# Patient Record
Sex: FEMALE | Race: WHITE | Employment: FULL TIME | URBAN - METROPOLITAN AREA
[De-identification: names, ages, dates, MRNs, and addresses within clinical notes are randomized per-mention and may not be internally consistent; named-entity substitution may affect disease eponyms.]

---

## 2017-01-14 ENCOUNTER — OFFICE VISIT (OUTPATIENT)
Dept: FAMILY MEDICINE CLINIC | Facility: CLINIC | Age: 37
End: 2017-01-14

## 2017-01-14 VITALS
OXYGEN SATURATION: 98 % | WEIGHT: 191 LBS | BODY MASS INDEX: 31.44 KG/M2 | HEIGHT: 65.5 IN | RESPIRATION RATE: 16 BRPM | TEMPERATURE: 98 F | HEART RATE: 112 BPM | DIASTOLIC BLOOD PRESSURE: 84 MMHG | SYSTOLIC BLOOD PRESSURE: 130 MMHG

## 2017-01-14 DIAGNOSIS — J45.901 ASTHMA WITH ACUTE EXACERBATION, UNSPECIFIED ASTHMA SEVERITY: ICD-10-CM

## 2017-01-14 DIAGNOSIS — J20.9 ACUTE BRONCHITIS, UNSPECIFIED ORGANISM: Primary | ICD-10-CM

## 2017-01-14 PROCEDURE — 99213 OFFICE O/P EST LOW 20 MIN: CPT | Performed by: NURSE PRACTITIONER

## 2017-01-14 RX ORDER — ALBUTEROL SULFATE 90 UG/1
2 AEROSOL, METERED RESPIRATORY (INHALATION) EVERY 4 HOURS PRN
Qty: 1 INHALER | Refills: 0 | Status: SHIPPED | OUTPATIENT
Start: 2017-01-14 | End: 2017-06-01

## 2017-01-14 RX ORDER — PREDNISONE 20 MG/1
TABLET ORAL
Qty: 10 TABLET | Refills: 0 | Status: SHIPPED | OUTPATIENT
Start: 2017-01-14 | End: 2017-02-02

## 2017-01-14 RX ORDER — AMOXICILLIN AND CLAVULANATE POTASSIUM 875; 125 MG/1; MG/1
1 TABLET, FILM COATED ORAL 2 TIMES DAILY
Qty: 20 TABLET | Refills: 0 | Status: SHIPPED | OUTPATIENT
Start: 2017-01-14 | End: 2017-01-24

## 2017-01-14 RX ORDER — BENZONATATE 200 MG/1
200 CAPSULE ORAL 3 TIMES DAILY PRN
Qty: 20 CAPSULE | Refills: 0 | Status: SHIPPED | OUTPATIENT
Start: 2017-01-14 | End: 2017-01-21

## 2017-01-14 NOTE — PATIENT INSTRUCTIONS
Discharge Instructions for Asthma  You have been diagnosed with asthma. With the help of your healthcare provider, you can keep your asthma under control and have less emergency department visits and hospitalizations.     Managing asthma  · Take your asth · Don’t allow anyone to smoke in your home, in your car, or around you. · Make sure you know what to do if exercise is a trigger for you. Many people use quick-relief inhalers before exercise or physical activity.   · Get a flu shot every year and get pneu If there is a lot of inflammation, air flow is restricted. The air passages may also go into spasm, especially if you have asthma. This causes wheezing and difficulty breathing even in people who do not have asthma. Bronchitis usually lasts 7 to 14 days. · If prescribed, finish all antibiotic medicine, even if you are feeling better after only a few days. Follow-up care  Follow up with your healthcare provider, or as advised. If you had an X-ray or ECG (electrocardiogram), a specialist will review it.  You

## 2017-01-14 NOTE — PROGRESS NOTES
CHIEF COMPLAINT:   Patient presents with:  Cough      HPI:   Pieter Juarez is a 44year old female who presents for cold symptoms for  2  weeks.    Pt was treated for sinus infection, right OM with amox, completed 10 day course 5 days ago, was approx 80% MI, CABG   • Diabetes Father    • cva[Other] [OTHER] Maternal Grandmother    • Cancer Paternal Grandfather 58     lung cancer        Smoking Status: Former Smoker                   Packs/Day: 0.50  Years: 13        Types: Cigarettes      Quit date: 11/01 Comfort care instructions as listed in Patient Instructions    Meds & Refills for this Visit:    Signed Prescriptions Disp Refills    Amoxicillin-Pot Clavulanate 875-125 MG Oral Tab 20 tablet 0      Sig: Take 1 tablet by mouth 2 (two) times daily.       pre · Use a vacuum with a double-layered bag or HEPA (high-efficiency particulate air) filter. · Pets with fur or feathers are triggers for some people.  If you must have pets, take these precautions:  · Keep pets out of your bedroom and off your bed. Keep the · If you monitor symptoms with a peak flow meter, readings less than 50% of your personal best   Date Last Reviewed: 8/18/2014  © 2842-8516 [facility] [address] [place] [address], [address] [place] [address]. All rights reserved.  This information is not i · You may use over-the-counter medicine to control fever or pain, unless another medicine was prescribed.  Note: If you have chronic liver or kidney disease or have ever had a stomach ulcer or gastrointestinal bleeding, talk with your healthcare provider be · Weakness, drowsiness, headache, facial pain, ear pain, or a stiff neck  Call 911, or get immediate medical care  Contact emergency services right away if any of these occur.   · Coughing up blood  · Worsening weakness, drowsiness, headache, or stiff neck

## 2017-02-02 ENCOUNTER — OFFICE VISIT (OUTPATIENT)
Dept: FAMILY MEDICINE CLINIC | Facility: CLINIC | Age: 37
End: 2017-02-02

## 2017-02-02 VITALS
SYSTOLIC BLOOD PRESSURE: 110 MMHG | DIASTOLIC BLOOD PRESSURE: 80 MMHG | OXYGEN SATURATION: 98 % | HEART RATE: 76 BPM | RESPIRATION RATE: 16 BRPM

## 2017-02-02 DIAGNOSIS — I10 ESSENTIAL HYPERTENSION: Primary | ICD-10-CM

## 2017-02-02 DIAGNOSIS — J45.30 MILD PERSISTENT ASTHMA WITHOUT COMPLICATION: ICD-10-CM

## 2017-02-02 PROCEDURE — 99214 OFFICE O/P EST MOD 30 MIN: CPT | Performed by: FAMILY MEDICINE

## 2017-02-02 NOTE — PROGRESS NOTES
Flavio Coleman is a 44year old female. Patient presents with: Follow - Up: blood pressure follow up      HPI:   Patient presents for recheck of her hypertension.  Pt has been taking medications as instructed, no medication side effects, home BP monitori puff into the lungs daily. Disp: 12 g Rfl: 1   Peak Flow Meter Does not apply Device When symptoms are well controlled, check peak flows at least once per week. 3 attempts and take the best number.  Disp: 1 Device Rfl: 0   Albuterol Sulfate HFA (PROAIR HFA improved  - cont lisinopril 10mg daily  - refill with 90 days when due next  Follow up in 4 mo    2.  Mild persistent asthma without complication  - peak flow 310   - will start ICS   - flovent 110mcg 1 puff daily  Patient instructed to make sure to rinse o

## 2017-02-03 ENCOUNTER — PATIENT MESSAGE (OUTPATIENT)
Dept: FAMILY MEDICINE CLINIC | Facility: CLINIC | Age: 37
End: 2017-02-03

## 2017-02-03 ENCOUNTER — TELEPHONE (OUTPATIENT)
Dept: FAMILY MEDICINE CLINIC | Facility: CLINIC | Age: 37
End: 2017-02-03

## 2017-02-03 NOTE — TELEPHONE ENCOUNTER
From: Pieter Juarez  To: Irish Parrish MD  Sent: 2/3/2017 6:57 AM CST  Subject: Prescription Question    Good morning:   I have an appointment yesterday evening and having a peak flow meter for my asthma was discussed.  A prescription for one was not sent

## 2017-02-03 NOTE — TELEPHONE ENCOUNTER
Please see above. Are there specific peak flow goals patient? Please advise. Thank you!     Triage, peak flow and flovent scripts re-sent to [facility].

## 2017-02-23 RX ORDER — LISINOPRIL 10 MG/1
TABLET ORAL
Qty: 30 TABLET | Refills: 2 | Status: SHIPPED | OUTPATIENT
Start: 2017-02-23 | End: 2017-06-01

## 2017-05-08 ENCOUNTER — OFFICE VISIT (OUTPATIENT)
Dept: FAMILY MEDICINE CLINIC | Facility: CLINIC | Age: 37
End: 2017-05-08

## 2017-05-08 VITALS
OXYGEN SATURATION: 97 % | DIASTOLIC BLOOD PRESSURE: 80 MMHG | WEIGHT: 203 LBS | TEMPERATURE: 98 F | BODY MASS INDEX: 33 KG/M2 | HEART RATE: 110 BPM | RESPIRATION RATE: 16 BRPM | SYSTOLIC BLOOD PRESSURE: 118 MMHG

## 2017-05-08 DIAGNOSIS — S81.801A WOUND OF RIGHT LEG, INITIAL ENCOUNTER: Primary | ICD-10-CM

## 2017-05-08 PROCEDURE — 99212 OFFICE O/P EST SF 10 MIN: CPT | Performed by: NURSE PRACTITIONER

## 2017-05-08 NOTE — PATIENT INSTRUCTIONS
Incision Care  Remember: Follow-up visits allow your doctor to make sure your incision is healing well. Be sure to keep your appointments.    Stitches (sutures), surgical staples, special strips of surgical tape called Steri-Strips, or surgical skin glue · Skin glue. Don’t put liquid, ointment, or cream on your wound while the glue is in place. Avoid activities that cause heavy sweating. Protect the wound from sunlight. Do not scratch, rub, or pick at the glue.  Do not put tape directly over the glue. The siobhan · More pain, redness, swelling, bleeding, or foul-smelling discharge around the incision area  · Fever of 100.4°F (38ºC) or higher or as directed by your healthcare provider  · Shaking chills  · Vomiting or nausea that doesn't go away  · Numbness, coldness

## 2017-05-08 NOTE — PROGRESS NOTES
CHIEF COMPLAINT:   Patient presents with:  Derm Problem: Right leg. HPI:   Scott Duarte is a 44year old female who presents for evaluation of a possible infection of a punch biopsy.  Pt states about 2 weeks ago pt had several punch/shaved biopsy 47     MI, CABG   • Diabetes Father    • cva[Other] [OTHER] Maternal Grandmother    • Cancer Paternal Grandfather 58     lung cancer        Smoking Status: Former Smoker                   Packs/Day: 0.50  Years: 13        Types: Cigarettes      Quit date: care discussed with patient.    Educated on proper wound care  Educated on following up with derm as derm instructed   Educated on s/s of worsening sx and when to seek higher level of care    Meds & Refills for this Visit:    No prescriptions requested or o

## 2017-06-01 ENCOUNTER — OFFICE VISIT (OUTPATIENT)
Dept: FAMILY MEDICINE CLINIC | Facility: CLINIC | Age: 37
End: 2017-06-01

## 2017-06-01 VITALS
RESPIRATION RATE: 16 BRPM | SYSTOLIC BLOOD PRESSURE: 110 MMHG | HEART RATE: 94 BPM | TEMPERATURE: 99 F | BODY MASS INDEX: 33 KG/M2 | WEIGHT: 203 LBS | DIASTOLIC BLOOD PRESSURE: 80 MMHG | OXYGEN SATURATION: 98 %

## 2017-06-01 DIAGNOSIS — J45.21 MILD INTERMITTENT ASTHMA WITH ACUTE EXACERBATION: ICD-10-CM

## 2017-06-01 DIAGNOSIS — E78.00 HYPERCHOLESTEROLEMIA: ICD-10-CM

## 2017-06-01 DIAGNOSIS — I10 ESSENTIAL HYPERTENSION: Primary | ICD-10-CM

## 2017-06-01 PROBLEM — J45.20 MILD INTERMITTENT ASTHMA WITHOUT COMPLICATION (HCC): Status: ACTIVE | Noted: 2017-06-01

## 2017-06-01 PROBLEM — J45.20 MILD INTERMITTENT ASTHMA WITHOUT COMPLICATION: Status: ACTIVE | Noted: 2017-06-01

## 2017-06-01 PROCEDURE — 99213 OFFICE O/P EST LOW 20 MIN: CPT | Performed by: FAMILY MEDICINE

## 2017-06-01 RX ORDER — ALBUTEROL SULFATE 90 UG/1
2 AEROSOL, METERED RESPIRATORY (INHALATION) EVERY 4 HOURS PRN
Qty: 1 INHALER | Refills: 3 | Status: SHIPPED | OUTPATIENT
Start: 2017-06-01 | End: 2018-09-03

## 2017-06-01 RX ORDER — LISINOPRIL 10 MG/1
10 TABLET ORAL
Qty: 90 TABLET | Refills: 1 | Status: SHIPPED | OUTPATIENT
Start: 2017-06-01 | End: 2017-11-15

## 2017-06-01 RX ORDER — PREDNISONE 20 MG/1
40 TABLET ORAL DAILY
Qty: 10 TABLET | Refills: 0 | Status: SHIPPED | OUTPATIENT
Start: 2017-06-01 | End: 2017-06-06

## 2017-06-01 NOTE — PROGRESS NOTES
Liv Ferrer is a 44year old female. Patient presents with: Follow - Up      HPI:     Here to follow up   1. HTN  Patient presents for recheck of her hypertension.    Pt has been taking medications as instructed, no medication side effects, home BP mo total) by mouth once daily. Disp: 90 tablet Rfl: 1   Ipratropium-Albuterol (COMBIVENT RESPIMAT)  MCG/ACT Inhalation Aero Soln Inhale 1 puff into the lungs every 6 (six) hours as needed (wheeizing and congestion).  Disp: 4 g Rfl: 2   predniSONE 20 MG O with acute exacerbation  Steroid burst for acute flare, combivent prn   Asthma action plan and asthma control test done.   Continue flovent 1 puff once daily   Reassess and see if she can wean off        Meds & Refills for this Visit:  Signed Prescriptions

## 2017-06-01 NOTE — PATIENT INSTRUCTIONS
· Continue to use Flovent once a day  · Use albuterol/ProAir as needed for activity and exercise. You can also use this before exercise. · Use Combivent as needed when you have wheezing plus congestion.   Do not use albuterol in addition to this - unless

## 2017-06-28 ENCOUNTER — OFFICE VISIT (OUTPATIENT)
Dept: FAMILY MEDICINE CLINIC | Facility: CLINIC | Age: 37
End: 2017-06-28

## 2017-06-28 VITALS
RESPIRATION RATE: 16 BRPM | WEIGHT: 207 LBS | BODY MASS INDEX: 34.49 KG/M2 | SYSTOLIC BLOOD PRESSURE: 122 MMHG | TEMPERATURE: 98 F | DIASTOLIC BLOOD PRESSURE: 80 MMHG | OXYGEN SATURATION: 98 % | HEIGHT: 65 IN | HEART RATE: 96 BPM

## 2017-06-28 DIAGNOSIS — H92.01 OTALGIA OF RIGHT EAR: Primary | ICD-10-CM

## 2017-06-28 PROCEDURE — 99213 OFFICE O/P EST LOW 20 MIN: CPT | Performed by: FAMILY MEDICINE

## 2017-06-29 NOTE — PROGRESS NOTES
Omar Neely is a 44year old female. Patient presents with:  Ear Pain: Right ear. HPI:   Omar Neely is a 44year old female who presents to clinic today with complaints of right ear pain. Has had for 2  days.  Pain is described as pain and fe Inhalation Aero Soln Inhale 1 puff into the lungs every 6 (six) hours as needed (wheeizing and congestion). Disp: 4 g Rfl: 2   Fluticasone Propionate HFA (FLOVENT HFA) 110 MCG/ACT Inhalation Aerosol Inhale 1 puff into the lungs daily.  Disp: 12 g Rfl: 1   l Patient/Caregiver Education: There are no barriers to learning. Medical education done. Outcome: Patient verbalizes understanding. Patient is notified to call with any questions, complications, allergies, or worsening or changing symptoms.   Patient is to

## 2017-07-28 RX ORDER — LISINOPRIL 10 MG/1
TABLET ORAL
Qty: 30 TABLET | Refills: 2 | Status: SHIPPED | OUTPATIENT
Start: 2017-07-28 | End: 2017-11-15

## 2017-08-26 ENCOUNTER — APPOINTMENT (OUTPATIENT)
Dept: LAB | Age: 37
End: 2017-08-26
Attending: FAMILY MEDICINE
Payer: COMMERCIAL

## 2017-08-26 DIAGNOSIS — N91.5 OLIGOMENORRHEA: ICD-10-CM

## 2017-08-26 LAB
ALBUMIN SERPL-MCNC: 3.9 G/DL (ref 3.5–4.8)
ALP LIVER SERPL-CCNC: 51 U/L (ref 37–98)
ALT SERPL-CCNC: 20 U/L (ref 14–54)
AST SERPL-CCNC: 12 U/L (ref 15–41)
BILIRUB SERPL-MCNC: 0.6 MG/DL (ref 0.1–2)
BUN BLD-MCNC: 7 MG/DL (ref 8–20)
CALCIUM BLD-MCNC: 9.2 MG/DL (ref 8.3–10.3)
CHLORIDE: 109 MMOL/L (ref 101–111)
CHOLEST SMN-MCNC: 196 MG/DL (ref ?–200)
CO2: 23 MMOL/L (ref 22–32)
CREAT BLD-MCNC: 0.77 MG/DL (ref 0.55–1.02)
GLUCOSE BLD-MCNC: 86 MG/DL (ref 70–99)
HDLC SERPL-MCNC: 51 MG/DL (ref 45–?)
HDLC SERPL: 3.84 {RATIO} (ref ?–4.44)
LDLC SERPL CALC-MCNC: 117 MG/DL (ref ?–130)
LDLC SERPL-MCNC: 28 MG/DL (ref 5–40)
M PROTEIN MFR SERPL ELPH: 7 G/DL (ref 6.1–8.3)
NONHDLC SERPL-MCNC: 145 MG/DL (ref ?–130)
POTASSIUM SERPL-SCNC: 3.9 MMOL/L (ref 3.6–5.1)
SODIUM SERPL-SCNC: 140 MMOL/L (ref 136–144)
TRIGLYCERIDES: 141 MG/DL (ref ?–150)

## 2017-11-15 ENCOUNTER — OFFICE VISIT (OUTPATIENT)
Dept: FAMILY MEDICINE CLINIC | Facility: CLINIC | Age: 37
End: 2017-11-15

## 2017-11-15 VITALS
SYSTOLIC BLOOD PRESSURE: 122 MMHG | RESPIRATION RATE: 16 BRPM | WEIGHT: 211 LBS | DIASTOLIC BLOOD PRESSURE: 76 MMHG | HEART RATE: 86 BPM | HEIGHT: 65 IN | TEMPERATURE: 98 F | BODY MASS INDEX: 35.16 KG/M2

## 2017-11-15 DIAGNOSIS — J45.30 MILD PERSISTENT ASTHMA WITHOUT COMPLICATION: ICD-10-CM

## 2017-11-15 DIAGNOSIS — I10 ESSENTIAL HYPERTENSION: Primary | ICD-10-CM

## 2017-11-15 PROCEDURE — 99214 OFFICE O/P EST MOD 30 MIN: CPT | Performed by: FAMILY MEDICINE

## 2017-11-15 RX ORDER — LISINOPRIL 10 MG/1
10 TABLET ORAL
Qty: 90 TABLET | Refills: 3 | Status: SHIPPED | OUTPATIENT
Start: 2017-11-15 | End: 2018-12-09

## 2017-11-15 NOTE — PROGRESS NOTES
Robin Perales is a 44year old female. Patient presents with:  Blood Pressure      HPI:     HYPERTENSION  Patient presents for recheck of her hypertension.  Pt has been taking medications as instructed, no medication side effects  No chest pain, SOB, hea Breath. Disp: 1 Inhaler Rfl: 3   Ipratropium-Albuterol (COMBIVENT RESPIMAT)  MCG/ACT Inhalation Aero Soln Inhale 1 puff into the lungs every 6 (six) hours as needed (wheeizing and congestion).  Disp: 4 g Rfl: 2   loratadine (CLARITIN) 10 MG Oral Tab T lisinopril 10 MG Oral Tab 90 tablet 3      Sig: Take 1 tablet (10 mg total) by mouth once daily. Mometasone Furo-Formoterol Fum (DULERA) 100-5 MCG/ACT Inhalation Aerosol 1 Inhaler 0      Sig: Inhale 1 puff into the lungs 2 (two) times daily.

## 2017-11-16 NOTE — PATIENT INSTRUCTIONS
Continue your lisinopril  Stop Flovent HFA (Fluticasone inhaler)  Start Dulera 1 puff twice a day  A prescription was sent to the pharmacy for a refill.  You may need the coupon card  Your peak flow should be around 430-440

## 2018-04-19 ENCOUNTER — OFFICE VISIT (OUTPATIENT)
Dept: FAMILY MEDICINE CLINIC | Facility: CLINIC | Age: 38
End: 2018-04-19

## 2018-04-19 VITALS
SYSTOLIC BLOOD PRESSURE: 118 MMHG | TEMPERATURE: 98 F | BODY MASS INDEX: 33.99 KG/M2 | RESPIRATION RATE: 16 BRPM | HEIGHT: 65 IN | DIASTOLIC BLOOD PRESSURE: 78 MMHG | HEART RATE: 90 BPM | OXYGEN SATURATION: 98 % | WEIGHT: 204 LBS

## 2018-04-19 DIAGNOSIS — J06.9 VIRAL URI: ICD-10-CM

## 2018-04-19 DIAGNOSIS — J45.901 EXACERBATION OF ASTHMA, UNSPECIFIED ASTHMA SEVERITY, UNSPECIFIED WHETHER PERSISTENT: Primary | ICD-10-CM

## 2018-04-19 PROCEDURE — 99213 OFFICE O/P EST LOW 20 MIN: CPT | Performed by: NURSE PRACTITIONER

## 2018-04-19 PROCEDURE — 88175 CYTOPATH C/V AUTO FLUID REDO: CPT | Performed by: OBSTETRICS & GYNECOLOGY

## 2018-04-19 PROCEDURE — 87624 HPV HI-RISK TYP POOLED RSLT: CPT | Performed by: OBSTETRICS & GYNECOLOGY

## 2018-04-19 RX ORDER — PREDNISONE 20 MG/1
20 TABLET ORAL 2 TIMES DAILY
Qty: 10 TABLET | Refills: 0 | Status: SHIPPED | OUTPATIENT
Start: 2018-04-19 | End: 2018-04-24

## 2018-04-19 NOTE — PROGRESS NOTES
CHIEF COMPLAINT:   Patient presents with:  Nasal Congestion: awoke this morning with congestion, not taking asthma med, feels wheezy today      HPI:   Theresa Turner is a 43year old female who presents for cold symptoms since this morning.  Reports sinus lung cancer      Smoking status: Former Smoker                                                              Packs/day: 0.50      Years: 13.00        Types: Cigarettes     Quit date: 11/1/2010  Smokeless tobacco: Never Used                      Alcohol us Exacerbation of asthma: Meds as below. Comfort care instructions as listed in Patient Instructions. Advised pt to take her inhalers as prescribed. Viral URI: Comfort care per pt instructions. Advised mucinex or flonase otc.      Meds & Refills for this · If you do vacuum and dust yourself, wear a dust mask. You can buy them from the hardware store. · Use a vacuum with a double-layered bag or HEPA (high-efficiency particulate air) filter. Pets with fur or feathers are triggers for some people.  If you mu · Shortness of breath that is not relieved by your quick-relief medicine  · Trouble walking or talking because of shortness of breath  · Blue lips or fingernails  · If you monitor symptoms with a peak flow meter, readings less than 50% of your personal bes · Your appetite may be poor, so a light diet is fine. Avoid dehydration by drinking 6 to 8 glasses of fluids per day (water, soft drinks, juices, tea, or soup). Extra fluids will help loosen secretions in the nose and lungs.   · Over-the-counter cold medici

## 2018-04-19 NOTE — PATIENT INSTRUCTIONS
Discharge Instructions for Asthma  You have been diagnosed with an asthma attack.  With the help of your healthcare provider, you can keep your asthma under control and have less emergency department visits and stays in the hospital.    Managing asthma  · · Don't use carpets or cloth-covered furniture in your home. If this is not possible, keep pets out of rooms with these items. · Have someone bathe your pets every week. And brush them often. If you smoke, do your best to quit.   · Enroll in a stop-smokin © 7848-2820 [employer] [address] [address]. [address] [facility], [facility] [place]. All rights reserved. This information is not intended as a substitute for professional medical care. Always follow your healthcare professional's instructions.         Viral U · Over-the-counter cold medicines will not shorten the length of time you’re sick, but they may be helpful for the following symptoms: cough, sore throat, and nasal and sinus congestion.  (Note: Do not use decongestants if you have high blood pressure.)  Fo

## 2018-09-03 ENCOUNTER — OFFICE VISIT (OUTPATIENT)
Dept: FAMILY MEDICINE CLINIC | Facility: CLINIC | Age: 38
End: 2018-09-03
Payer: COMMERCIAL

## 2018-09-03 VITALS
DIASTOLIC BLOOD PRESSURE: 78 MMHG | BODY MASS INDEX: 36 KG/M2 | HEART RATE: 89 BPM | TEMPERATURE: 98 F | SYSTOLIC BLOOD PRESSURE: 128 MMHG | OXYGEN SATURATION: 98 % | WEIGHT: 213.63 LBS | RESPIRATION RATE: 18 BRPM

## 2018-09-03 DIAGNOSIS — J06.9 VIRAL URI WITH COUGH: Primary | ICD-10-CM

## 2018-09-03 DIAGNOSIS — J45.30 MILD PERSISTENT ASTHMA WITHOUT COMPLICATION: ICD-10-CM

## 2018-09-03 PROCEDURE — 99213 OFFICE O/P EST LOW 20 MIN: CPT | Performed by: NURSE PRACTITIONER

## 2018-09-03 RX ORDER — PREDNISONE 20 MG/1
40 TABLET ORAL DAILY
Qty: 10 TABLET | Refills: 0 | Status: SHIPPED | OUTPATIENT
Start: 2018-09-03 | End: 2018-09-08

## 2018-09-03 RX ORDER — ALBUTEROL SULFATE 90 UG/1
2 AEROSOL, METERED RESPIRATORY (INHALATION) EVERY 4 HOURS PRN
Qty: 1 INHALER | Refills: 1 | Status: SHIPPED | OUTPATIENT
Start: 2018-09-03 | End: 2020-03-20

## 2018-09-03 NOTE — PATIENT INSTRUCTIONS
· Please start Prednisone 40 mg daily tomorrow morning for wheeze/inflammation. · Mucinex single ingredient capsule every 12 hours as directed on package for thinning mucus. · Continue allergy meds. as directed.   · Use saline nasal spray to nose as needed

## 2018-12-10 NOTE — TELEPHONE ENCOUNTER
Medication(s) to Refill:   Requested Prescriptions     Pending Prescriptions Disp Refills   • LISINOPRIL 10 MG Oral Tab [Pharmacy Med Name: LISINOPRIL 10 MG TABLET] 90 tablet 3     Sig: TAKE 1 TABLET BY MOUTH EVERY DAY         Reason for Medication Refill

## 2018-12-11 RX ORDER — LISINOPRIL 10 MG/1
TABLET ORAL
Qty: 90 TABLET | Refills: 1 | Status: SHIPPED | OUTPATIENT
Start: 2018-12-11 | End: 2019-06-05

## 2018-12-26 ENCOUNTER — OFFICE VISIT (OUTPATIENT)
Dept: FAMILY MEDICINE CLINIC | Facility: CLINIC | Age: 38
End: 2018-12-26
Payer: COMMERCIAL

## 2018-12-26 VITALS
HEIGHT: 65 IN | BODY MASS INDEX: 34.16 KG/M2 | RESPIRATION RATE: 16 BRPM | DIASTOLIC BLOOD PRESSURE: 80 MMHG | OXYGEN SATURATION: 99 % | WEIGHT: 205 LBS | TEMPERATURE: 98 F | SYSTOLIC BLOOD PRESSURE: 124 MMHG | HEART RATE: 92 BPM

## 2018-12-26 DIAGNOSIS — R20.0 NUMBNESS AND TINGLING: Primary | ICD-10-CM

## 2018-12-26 DIAGNOSIS — R20.2 NUMBNESS AND TINGLING: Primary | ICD-10-CM

## 2018-12-26 DIAGNOSIS — Z00.00 LABORATORY EXAM ORDERED AS PART OF ROUTINE GENERAL MEDICAL EXAMINATION: ICD-10-CM

## 2018-12-26 PROCEDURE — 99213 OFFICE O/P EST LOW 20 MIN: CPT | Performed by: FAMILY MEDICINE

## 2018-12-26 NOTE — PATIENT INSTRUCTIONS
· To help with pain and swelling, take ibuprofen (advil, Motrin) 600mg every 6 hours, OR take Aleve (naproxen) 500mg every 12 hours  · Take one of these medications around the clock for 5-7 days  · Heating pad can help relax muscles to avoid pinching a ner

## 2018-12-27 ENCOUNTER — APPOINTMENT (OUTPATIENT)
Dept: LAB | Age: 38
End: 2018-12-27
Attending: FAMILY MEDICINE
Payer: COMMERCIAL

## 2019-06-05 RX ORDER — LISINOPRIL 10 MG/1
10 TABLET ORAL
Qty: 90 TABLET | Refills: 1 | Status: SHIPPED | OUTPATIENT
Start: 2019-06-05 | End: 2019-07-19 | Stop reason: ALTCHOICE

## 2019-06-05 NOTE — TELEPHONE ENCOUNTER
Medication(s) to Refill:   Requested Prescriptions     Pending Prescriptions Disp Refills   • lisinopril 10 MG Oral Tab 90 tablet 1     Sig: Take 1 tablet (10 mg total) by mouth once daily.          Reason for Medication Refill being sent to Provider / Karren Collet

## 2019-06-18 ENCOUNTER — PATIENT OUTREACH (OUTPATIENT)
Dept: FAMILY MEDICINE CLINIC | Facility: CLINIC | Age: 39
End: 2019-06-18

## 2019-08-24 ENCOUNTER — NURSE ONLY (OUTPATIENT)
Dept: FAMILY MEDICINE CLINIC | Facility: CLINIC | Age: 39
End: 2019-08-24
Payer: COMMERCIAL

## 2019-08-24 VITALS
SYSTOLIC BLOOD PRESSURE: 126 MMHG | WEIGHT: 194 LBS | HEART RATE: 108 BPM | BODY MASS INDEX: 32.32 KG/M2 | OXYGEN SATURATION: 97 % | HEIGHT: 65 IN | DIASTOLIC BLOOD PRESSURE: 62 MMHG | RESPIRATION RATE: 16 BRPM | TEMPERATURE: 98 F

## 2019-08-24 DIAGNOSIS — J45.30 MILD PERSISTENT ASTHMA WITHOUT COMPLICATION: ICD-10-CM

## 2019-08-24 DIAGNOSIS — J06.9 VIRAL URI WITH COUGH: Primary | ICD-10-CM

## 2019-08-24 PROCEDURE — 99213 OFFICE O/P EST LOW 20 MIN: CPT | Performed by: FAMILY MEDICINE

## 2019-08-24 RX ORDER — PREDNISONE 20 MG/1
TABLET ORAL
Qty: 10 TABLET | Refills: 0 | Status: SHIPPED | OUTPATIENT
Start: 2019-08-24 | End: 2019-12-16 | Stop reason: ALTCHOICE

## 2019-08-24 NOTE — PROGRESS NOTES
CHIEF COMPLAINT:   Patient presents with:  Chest Congestion: coughing yellow mucous, chest congestion      HPI:   Alexandro Romo is a 35year old female who presents for upper respiratory symptoms for  4 days.  Patient reports sore throat only at the begin No        REVIEW OF SYSTEMS:   GENERAL: feels well otherwise,  fair appetite  SKIN: no rashes or abnormal skin lesions  HEENT: See HPI  LUNGS: denies shortness of breath.  See HPI  CARDIOVASCULAR: denies chest pain or palpitations   GI: denies N/V/C or abdo patient indicates understanding of these issues and agrees to the plan. The patient is asked to f/u with PCP if congestion or wheezing sx's persist or worsen.

## 2019-08-24 NOTE — PATIENT INSTRUCTIONS
Discharge Instructions for Asthma  You have been diagnosed with an asthma attack.  With the help of your healthcare provider, you can keep your asthma under control and have less emergency department visits and stays in the hospital.    Managing asthma  · · Don't use carpets or cloth-covered furniture in your home. If this is not possible, keep pets out of rooms with these items. · Have someone bathe your pets every week. And brush them often. If you smoke, do your best to quit.   · Enroll in a stop-smokin © 3106-4504 [employer] [address]. [address] [facility], [facility] [place]. All rights reserved. This information is not intended as a substitute for professional medical care. Always follow your healthcare professional's instructions.

## 2019-09-06 ENCOUNTER — MED REC SCAN ONLY (OUTPATIENT)
Dept: FAMILY MEDICINE CLINIC | Facility: CLINIC | Age: 39
End: 2019-09-06

## 2019-12-16 ENCOUNTER — OFFICE VISIT (OUTPATIENT)
Dept: FAMILY MEDICINE CLINIC | Facility: CLINIC | Age: 39
End: 2019-12-16
Payer: COMMERCIAL

## 2019-12-16 VITALS
TEMPERATURE: 98 F | RESPIRATION RATE: 16 BRPM | HEART RATE: 100 BPM | SYSTOLIC BLOOD PRESSURE: 120 MMHG | WEIGHT: 205 LBS | OXYGEN SATURATION: 99 % | BODY MASS INDEX: 34.16 KG/M2 | DIASTOLIC BLOOD PRESSURE: 86 MMHG | HEIGHT: 65 IN

## 2019-12-16 DIAGNOSIS — R53.82 CHRONIC FATIGUE: ICD-10-CM

## 2019-12-16 DIAGNOSIS — Z00.00 LABORATORY EXAMINATION ORDERED AS PART OF A ROUTINE GENERAL MEDICAL EXAMINATION: ICD-10-CM

## 2019-12-16 DIAGNOSIS — R22.32 AXILLARY LUMP, LEFT: Primary | ICD-10-CM

## 2019-12-16 PROCEDURE — 99213 OFFICE O/P EST LOW 20 MIN: CPT | Performed by: FAMILY MEDICINE

## 2019-12-16 NOTE — PROGRESS NOTES
[facility] Office Note  Chief Complaint:   Patient presents with:  Lump: x1 week, left armpit, some pain      HPI:   This is a 35year old female coming in for  HPI  Lump   Left axilla   States it was a little sore, but overall no • Ipratropium-Albuterol (COMBIVENT RESPIMAT)  MCG/ACT Inhalation Aero Soln Inhale 1 puff into the lungs every 6 (six) hours as needed (wheeizing and congestion). 4 g 2   • loratadine (CLARITIN) 10 MG Oral Tab Take 10 mg by mouth daily.      • Multiple Has follow up left diagnostic mammogram scheduled in 2 days  Will add axillary US to confirm dx     2.  Chronic fatigue  - CBC WITH DIFFERENTIAL WITH PLATELET  - TSH W REFLEX TO FREE T4  - B12 AND FOLATE    3. Laboratory examination ordered as part of a rou

## 2019-12-16 NOTE — PATIENT INSTRUCTIONS
Follow up after your lab work is completed.    Call the location your mammogram is scheduled and have them add the ultrasound left axilla (armpit) to the scheduled exam.

## 2019-12-26 ENCOUNTER — TELEPHONE (OUTPATIENT)
Dept: FAMILY MEDICINE CLINIC | Facility: CLINIC | Age: 39
End: 2019-12-26

## 2019-12-26 NOTE — TELEPHONE ENCOUNTER
New or ongoing issue: New     Brief description of symptoms: Swollen and itchy around the lip area     Approximately how long? : a few weeks     Offered appointment: ( YES OR NO ) Yes     Appointment scheduled:      12/27/19                  Scheduled with

## 2019-12-27 ENCOUNTER — OFFICE VISIT (OUTPATIENT)
Dept: FAMILY MEDICINE CLINIC | Facility: CLINIC | Age: 39
End: 2019-12-27
Payer: COMMERCIAL

## 2019-12-27 VITALS
BODY MASS INDEX: 34.32 KG/M2 | OXYGEN SATURATION: 99 % | SYSTOLIC BLOOD PRESSURE: 140 MMHG | RESPIRATION RATE: 16 BRPM | HEIGHT: 65 IN | WEIGHT: 206 LBS | DIASTOLIC BLOOD PRESSURE: 98 MMHG | HEART RATE: 80 BPM

## 2019-12-27 DIAGNOSIS — L30.9 DERMATITIS: Primary | ICD-10-CM

## 2019-12-27 DIAGNOSIS — I10 ESSENTIAL HYPERTENSION: ICD-10-CM

## 2019-12-27 PROCEDURE — 99214 OFFICE O/P EST MOD 30 MIN: CPT | Performed by: NURSE PRACTITIONER

## 2019-12-27 RX ORDER — METHYLPREDNISOLONE 4 MG/1
TABLET ORAL
Qty: 1 KIT | Refills: 0 | Status: SHIPPED | OUTPATIENT
Start: 2019-12-27 | End: 2020-02-14 | Stop reason: ALTCHOICE

## 2019-12-27 RX ORDER — NYSTATIN AND TRIAMCINOLONE ACETONIDE 100000; 1 [USP'U]/G; MG/G
1 OINTMENT TOPICAL 2 TIMES DAILY
Qty: 1 TUBE | Refills: 0 | Status: SHIPPED | OUTPATIENT
Start: 2019-12-27 | End: 2020-05-13 | Stop reason: ALTCHOICE

## 2019-12-27 NOTE — PATIENT INSTRUCTIONS
Nystatin; Triamcinolone cream or ointment  Brand Names: Mycogen-II, Mycolog II, Myco-Triacet-II, Mytrex, N.T.A. What is this medicine? NYSTATIN; TRIAMCINOLONE (aida STAT in; trye am SIN oh lone) is a combination of an antifungal medicine and a steroid. itching of the skin  · dark red spots on the skin  · loss of feeling on skin  · painful, red, pus-filled blisters in hair follicles  · skin infection  · thinning of the skin or sunburn: more likely if applied to the face  Side effects that usually do not r wear underwear that is tight-fitting or made from synthetic fibers such as tae or nylon. Instead, wear loose-fitting, cotton underwear. Also dry the area completely after bathing. NOTE:This sheet is a summary. It may not cover all possible information.

## 2019-12-27 NOTE — PROGRESS NOTES
Chief Complaint:   Patient presents with:  Rash: arms and lips     HPI:   This is a 35year old female presenting for evaluation of rash to bilateral arms and around her mouth x 3-4 weeks. Rash is patchy. Notices randomly on her arms.  Has 1 patch on her ab • Albuterol Sulfate HFA (PROAIR HFA) 108 (90 Base) MCG/ACT Inhalation Aero Soln Inhale 2 puffs into the lungs every 4 (four) hours as needed for Wheezing or Shortness of Breath.  1 Inhaler 1   • Ipratropium-Albuterol (COMBIVENT RESPIMAT)  MCG/ACT Blase Bobbi Neck: Normal range of motion. Neck supple. Cardiovascular: Normal rate, regular rhythm and intact distal pulses. Pulmonary/Chest: Effort normal and breath sounds normal. She has no wheezes. She has no rales. She exhibits no tenderness.    Abdominal: So

## 2019-12-30 NOTE — TELEPHONE ENCOUNTER
"Routing refill request to provider for review/approval because:  Labs out of range:  PHQ-9    Requested Prescriptions   Pending Prescriptions Disp Refills     sertraline (ZOLOFT) 50 MG tablet [Pharmacy Med Name: SERTRALINE 50MG TABLETS] 90 tablet 1     Sig: TAKE 1 TABLET BY MOUTH DAILY       SSRIs Protocol Failed - 12/27/2019  3:49 AM        Failed - PHQ-9 score less than 5 in past 6 months     Please review last PHQ-9 score.           Passed - Medication is active on med list        Passed - Patient is age 18 or older        Passed - No active pregnancy on record        Passed - No positive pregnancy test in last 12 months        Passed - Recent (6 mo) or future (30 days) visit within the authorizing provider's specialty     Patient had office visit in the last 6 months or has a visit in the next 30 days with authorizing provider or within the authorizing provider's specialty.  See \"Patient Info\" tab in inbasket, or \"Choose Columns\" in Meds & Orders section of the refill encounter.            Edilma Harmon RN  " FYI     Onset - A few weeks   Location - Area around lips, bottom worse than top lip  Duration - Intermittent  Characteristic - Itchy, minimal swelling  Aggravating factors - none   Relieving factors - none  Tx - tried aquaphor which helped with itching bu

## 2020-02-24 ENCOUNTER — MED REC SCAN ONLY (OUTPATIENT)
Dept: FAMILY MEDICINE CLINIC | Facility: CLINIC | Age: 40
End: 2020-02-24

## 2020-03-12 ENCOUNTER — TELEPHONE (OUTPATIENT)
Dept: FAMILY MEDICINE CLINIC | Facility: CLINIC | Age: 40
End: 2020-03-12

## 2020-03-12 NOTE — TELEPHONE ENCOUNTER
Ok to write a note stating pt has asthma and would benefit from working from home to reduce the risk of exposure to the coronavirus?

## 2020-03-12 NOTE — TELEPHONE ENCOUNTER
Patient called she is wondering if the doctor can write her a note stating she has asthma. Patient possibly will need this for work because she wants to work from home with what is going on. Patient rides a train everyday for work.  Please call patient at 7

## 2020-03-19 DIAGNOSIS — J45.30 MILD PERSISTENT ASTHMA WITHOUT COMPLICATION: ICD-10-CM

## 2020-03-20 RX ORDER — ALBUTEROL SULFATE 90 UG/1
2 AEROSOL, METERED RESPIRATORY (INHALATION) EVERY 4 HOURS PRN
Qty: 8.5 INHALER | Refills: 1 | Status: SHIPPED | OUTPATIENT
Start: 2020-03-20 | End: 2021-01-29

## 2020-03-20 NOTE — TELEPHONE ENCOUNTER
Medication(s) to Refill:   Requested Prescriptions     Pending Prescriptions Disp Refills   • ALBUTEROL SULFATE  (90 Base) MCG/ACT Inhalation Aero Soln [Pharmacy Med Name: ALBUTEROL HFA (PROAIR) INHALER] 8.5 Inhaler 1     Sig: INHALE 2 PUFFS INTO TH

## 2020-05-08 ENCOUNTER — TELEPHONE (OUTPATIENT)
Dept: FAMILY MEDICINE CLINIC | Facility: CLINIC | Age: 40
End: 2020-05-08

## 2020-05-08 DIAGNOSIS — R20.2 NUMBNESS AND TINGLING: ICD-10-CM

## 2020-05-08 DIAGNOSIS — I10 ESSENTIAL HYPERTENSION: Primary | ICD-10-CM

## 2020-05-08 DIAGNOSIS — R53.82 CHRONIC FATIGUE: ICD-10-CM

## 2020-05-08 DIAGNOSIS — R20.0 NUMBNESS AND TINGLING: ICD-10-CM

## 2020-05-08 NOTE — TELEPHONE ENCOUNTER
Pt was taken off of lisinopril 10 MG Oral Tab   When she lost some weight. With being home pt has put the weight back on and has noticed now an increase again in her bp. Should pt start taking lisinopril 10 MG Oral Tab   Again?   Also, with being in the [place]

## 2020-05-08 NOTE — TELEPHONE ENCOUNTER
Pt called back. Notified her recommendations below - she verbalizes understanding. Labs entered.     Appt scheduled with Dr. Andrea Juarez for 5/13/2020

## 2020-05-08 NOTE — TELEPHONE ENCOUNTER
Pt has gained the weight back that she lost since the pandemic started while being stuck at home. She noticed that her BP has increased to 130/90s. Denies sob, cp, or leg swelling.     Her lisinopril was d/boubacar last year because her weight loss subsequently

## 2020-05-08 NOTE — TELEPHONE ENCOUNTER
Her BP was elevated when she was seen in the office in December. Would like her to schedule follow up visit with PCP so that we can record BP reading in the office. Dr. Ashwini Rubio is in on Tuesday and has some openings.  Please have her continue to monitor BP th

## 2020-05-13 ENCOUNTER — OFFICE VISIT (OUTPATIENT)
Dept: FAMILY MEDICINE CLINIC | Facility: CLINIC | Age: 40
End: 2020-05-13
Payer: COMMERCIAL

## 2020-05-13 ENCOUNTER — APPOINTMENT (OUTPATIENT)
Dept: LAB | Age: 40
End: 2020-05-13
Attending: FAMILY MEDICINE
Payer: COMMERCIAL

## 2020-05-13 VITALS
RESPIRATION RATE: 16 BRPM | TEMPERATURE: 98 F | OXYGEN SATURATION: 98 % | BODY MASS INDEX: 36.32 KG/M2 | HEART RATE: 111 BPM | WEIGHT: 218 LBS | DIASTOLIC BLOOD PRESSURE: 90 MMHG | HEIGHT: 65 IN | SYSTOLIC BLOOD PRESSURE: 126 MMHG

## 2020-05-13 DIAGNOSIS — J30.2 SEASONAL ALLERGIES: ICD-10-CM

## 2020-05-13 DIAGNOSIS — I10 ESSENTIAL HYPERTENSION: ICD-10-CM

## 2020-05-13 DIAGNOSIS — J45.30 MILD PERSISTENT ASTHMA WITHOUT COMPLICATION: Primary | ICD-10-CM

## 2020-05-13 PROCEDURE — 99214 OFFICE O/P EST MOD 30 MIN: CPT | Performed by: FAMILY MEDICINE

## 2020-05-13 RX ORDER — LEVOCETIRIZINE DIHYDROCHLORIDE 5 MG/1
5 TABLET, FILM COATED ORAL EVERY EVENING
Qty: 90 TABLET | Refills: 1 | Status: SHIPPED | OUTPATIENT
Start: 2020-05-13 | End: 2020-10-27

## 2020-05-13 RX ORDER — MOMETASONE 50 UG/1
2 SPRAY, METERED NASAL DAILY
Qty: 17 G | Refills: 1 | Status: SHIPPED | OUTPATIENT
Start: 2020-05-13 | End: 2020-06-05

## 2020-05-13 NOTE — PROGRESS NOTES
[facility] Family Medicine Office Note  Chief Complaint:   Patient presents with:  Blood Pressure: Follow up       HPI:   This is a 38year old female coming in for  HPI  HTN  Had stopped lisinopril when she lost weight.    Now noticing BP elevate Meds:  Current Outpatient Medications   Medication Sig Dispense Refill   • Mometasone Furoate 50 MCG/ACT Nasal Suspension 2 sprays by Nasal route daily.  17 g 1   • Levocetirizine Dihydrochloride 5 MG Oral Tab Take 1 tablet (5 mg total) by mouth every eveni effusion. Left Ear: Tympanic membrane normal.  No middle ear effusion. Nose: No mucosal edema or rhinorrhea. Mouth/Throat: Oropharynx is clear and moist.   Cardiovascular: Normal rate and regular rhythm. No murmur heard.   Pulmonary/Chest: Effort no

## 2020-05-15 ENCOUNTER — PATIENT MESSAGE (OUTPATIENT)
Dept: FAMILY MEDICINE CLINIC | Facility: CLINIC | Age: 40
End: 2020-05-15

## 2020-05-15 DIAGNOSIS — E78.00 PURE HYPERCHOLESTEROLEMIA: Primary | ICD-10-CM

## 2020-05-15 RX ORDER — ROSUVASTATIN CALCIUM 5 MG/1
5 TABLET, COATED ORAL NIGHTLY
Qty: 90 TABLET | Refills: 1 | Status: SHIPPED | OUTPATIENT
Start: 2020-05-15 | End: 2020-10-27

## 2020-05-15 NOTE — TELEPHONE ENCOUNTER
From: Sonia Ramachandran  To: Abiola Martinez MD  Sent: 5/15/2020 9:27 AM CDT  Subject: Visit Follow-up Question    Good morning Dr. Corrie Early  Question regarding my lab results.    Does the 6 month medication mean the blood pressure pill I restarted or would I need

## 2020-05-15 NOTE — TELEPHONE ENCOUNTER
Pt sent message in regards to result note sent by PCP. Please confirm, you are instructing patient try ROSUVASTATIN for 6 months? Is patient supposed to be taking BP medication. Lisinopril is on med list as historical.     Please advise. Thank you!

## 2020-05-15 NOTE — TELEPHONE ENCOUNTER
Both for 6 months     If she loses weight effectively and BP drops under 110/70 - then she could stop lisinopril sooner than 6 mo    Rosuvastatin until we can see repeat blood work in 6 mo

## 2020-06-05 RX ORDER — MOMETASONE FUROATE 50 UG/1
SPRAY, METERED NASAL
Qty: 1 INHALER | Refills: 3 | Status: SHIPPED | OUTPATIENT
Start: 2020-06-05 | End: 2020-09-08

## 2020-06-07 ENCOUNTER — PATIENT MESSAGE (OUTPATIENT)
Dept: FAMILY MEDICINE CLINIC | Facility: CLINIC | Age: 40
End: 2020-06-07

## 2020-06-08 RX ORDER — LISINOPRIL 10 MG/1
TABLET ORAL
Qty: 90 TABLET | Refills: 1 | Status: SHIPPED | OUTPATIENT
Start: 2020-06-08 | End: 2020-12-18

## 2020-06-08 NOTE — TELEPHONE ENCOUNTER
From: Kirstie Watkins  To: Velvet Gruber MD  Sent: 6/7/2020 3:21 PM CDT  Subject: Other    Hello: This is not a rush but I realized this weekend that I do not know my blood type. Whenever someone gets a minute could you please let me know? Thank you.

## 2020-06-08 NOTE — TELEPHONE ENCOUNTER
Medication(s) to Refill:   Requested Prescriptions     Pending Prescriptions Disp Refills   • LISINOPRIL 10 MG Oral Tab [Pharmacy Med Name: LISINOPRIL 10 MG TABLET] 90 tablet 1     Sig: TAKE 1 TABLET BY MOUTH EVERY DAY         Reason for Medication Refill

## 2020-08-27 ENCOUNTER — TELEMEDICINE (OUTPATIENT)
Dept: FAMILY MEDICINE CLINIC | Facility: CLINIC | Age: 40
End: 2020-08-27
Payer: COMMERCIAL

## 2020-08-27 DIAGNOSIS — B35.6 TINEA CRURIS: Primary | ICD-10-CM

## 2020-08-27 PROCEDURE — 99213 OFFICE O/P EST LOW 20 MIN: CPT | Performed by: FAMILY MEDICINE

## 2020-08-27 RX ORDER — CLOTRIMAZOLE 1 %
CREAM (GRAM) TOPICAL
Qty: 60 G | Refills: 0 | Status: SHIPPED | OUTPATIENT
Start: 2020-08-27

## 2020-08-27 NOTE — PROGRESS NOTES
Virtual Telephone Check-In    Cathryn Matos verbally consents to a Virtual/Telephone Check-In visit on 08/27/20. Patient has been referred to the [facility] website at [email or web address] to review the yearly Consent to Treat document.     Patient underst MD

## 2020-09-09 RX ORDER — MOMETASONE 50 UG/1
2 SPRAY, METERED NASAL DAILY
Qty: 1 INHALER | Refills: 3 | Status: SHIPPED | OUTPATIENT
Start: 2020-09-09 | End: 2021-03-29

## 2020-10-27 RX ORDER — ROSUVASTATIN CALCIUM 5 MG/1
5 TABLET, COATED ORAL NIGHTLY
Qty: 90 TABLET | Refills: 3 | Status: SHIPPED | OUTPATIENT
Start: 2020-10-27 | End: 2021-10-13

## 2020-10-27 RX ORDER — LEVOCETIRIZINE DIHYDROCHLORIDE 5 MG/1
5 TABLET, FILM COATED ORAL EVERY EVENING
Qty: 90 TABLET | Refills: 3 | Status: SHIPPED | OUTPATIENT
Start: 2020-10-27 | End: 2021-10-20

## 2020-12-17 NOTE — TELEPHONE ENCOUNTER
Medication(s) to Refill:   Requested Prescriptions     Pending Prescriptions Disp Refills   • LISINOPRIL 10 MG Oral Tab [Pharmacy Med Name: LISINOPRIL 10 MG TABLET] 90 tablet 0     Sig: TAKE 1 TABLET BY MOUTH EVERY DAY         Reason for Medication Refill

## 2020-12-18 RX ORDER — LISINOPRIL 10 MG/1
TABLET ORAL
Qty: 90 TABLET | Refills: 0 | Status: SHIPPED | OUTPATIENT
Start: 2020-12-18 | End: 2021-03-30

## 2021-01-28 DIAGNOSIS — J45.30 MILD PERSISTENT ASTHMA WITHOUT COMPLICATION: ICD-10-CM

## 2021-01-29 RX ORDER — ALBUTEROL SULFATE 90 UG/1
2 AEROSOL, METERED RESPIRATORY (INHALATION) EVERY 4 HOURS PRN
Qty: 8.5 INHALER | Refills: 2 | Status: SHIPPED | OUTPATIENT
Start: 2021-01-29 | End: 2022-10-03

## 2021-03-24 ENCOUNTER — PATIENT MESSAGE (OUTPATIENT)
Dept: FAMILY MEDICINE CLINIC | Facility: CLINIC | Age: 41
End: 2021-03-24

## 2021-03-25 NOTE — TELEPHONE ENCOUNTER
From: Alexandro Romo  To: Shant Benson MD  Sent: 3/24/2021 4:51 PM CDT  Subject: Other    Hi: I received notice that I’m eligible to schedule my Covid Vaccine via my chart.  Just to update my medical history - I received my 2nd doses of Pfizer this past we

## 2021-03-29 NOTE — TELEPHONE ENCOUNTER
Medication(s) to Refill:   Requested Prescriptions     Pending Prescriptions Disp Refills   • LISINOPRIL 10 MG Oral Tab [Pharmacy Med Name: LISINOPRIL 10 MG TABLET] 90 tablet 0     Sig: TAKE 1 TABLET BY MOUTH EVERY DAY   • MOMETASONE FUROATE 50 MCG/ACT F[place]z

## 2021-03-30 RX ORDER — LISINOPRIL 10 MG/1
TABLET ORAL
Qty: 90 TABLET | Refills: 0 | Status: SHIPPED | OUTPATIENT
Start: 2021-03-30 | End: 2021-06-29

## 2021-03-30 RX ORDER — MOMETASONE 50 UG/1
SPRAY, METERED NASAL
Qty: 3 BOTTLE | Refills: 0 | Status: SHIPPED | OUTPATIENT
Start: 2021-03-30

## 2021-06-29 RX ORDER — LISINOPRIL 10 MG/1
TABLET ORAL
Qty: 30 TABLET | Refills: 0 | Status: SHIPPED | OUTPATIENT
Start: 2021-06-29 | End: 2021-07-22

## 2021-07-22 ENCOUNTER — LAB ENCOUNTER (OUTPATIENT)
Dept: LAB | Age: 41
End: 2021-07-22
Attending: FAMILY MEDICINE
Payer: COMMERCIAL

## 2021-07-22 ENCOUNTER — OFFICE VISIT (OUTPATIENT)
Dept: FAMILY MEDICINE CLINIC | Facility: CLINIC | Age: 41
End: 2021-07-22
Payer: COMMERCIAL

## 2021-07-22 VITALS
HEART RATE: 100 BPM | WEIGHT: 236 LBS | BODY MASS INDEX: 39.32 KG/M2 | TEMPERATURE: 97 F | RESPIRATION RATE: 18 BRPM | SYSTOLIC BLOOD PRESSURE: 128 MMHG | HEIGHT: 65 IN | DIASTOLIC BLOOD PRESSURE: 90 MMHG | OXYGEN SATURATION: 96 %

## 2021-07-22 DIAGNOSIS — L08.9 INFECTED SEBACEOUS CYST: ICD-10-CM

## 2021-07-22 DIAGNOSIS — J45.30 MILD PERSISTENT ASTHMA WITHOUT COMPLICATION: ICD-10-CM

## 2021-07-22 DIAGNOSIS — Z00.00 ROUTINE PHYSICAL EXAMINATION: Primary | ICD-10-CM

## 2021-07-22 DIAGNOSIS — Z00.00 LABORATORY EXAMINATION ORDERED AS PART OF A ROUTINE GENERAL MEDICAL EXAMINATION: ICD-10-CM

## 2021-07-22 DIAGNOSIS — I10 ESSENTIAL HYPERTENSION: ICD-10-CM

## 2021-07-22 DIAGNOSIS — Z23 NEED FOR PNEUMOCOCCAL VACCINATION: ICD-10-CM

## 2021-07-22 DIAGNOSIS — L72.3 INFECTED SEBACEOUS CYST: ICD-10-CM

## 2021-07-22 LAB
ALBUMIN SERPL-MCNC: 3.8 G/DL (ref 3.4–5)
ALBUMIN/GLOB SERPL: 1.2 {RATIO} (ref 1–2)
ALP LIVER SERPL-CCNC: 62 U/L
ALT SERPL-CCNC: 24 U/L
ANION GAP SERPL CALC-SCNC: 6 MMOL/L (ref 0–18)
AST SERPL-CCNC: 15 U/L (ref 15–37)
BASOPHILS # BLD AUTO: 0.05 X10(3) UL (ref 0–0.2)
BASOPHILS NFR BLD AUTO: 0.6 %
BILIRUB SERPL-MCNC: 0.6 MG/DL (ref 0.1–2)
BUN BLD-MCNC: 5 MG/DL (ref 7–18)
BUN/CREAT SERPL: 7.2 (ref 10–20)
CALCIUM BLD-MCNC: 9 MG/DL (ref 8.5–10.1)
CHLORIDE SERPL-SCNC: 108 MMOL/L (ref 98–112)
CHOLEST SMN-MCNC: 122 MG/DL (ref ?–200)
CO2 SERPL-SCNC: 25 MMOL/L (ref 21–32)
CREAT BLD-MCNC: 0.69 MG/DL
DEPRECATED RDW RBC AUTO: 43.7 FL (ref 35.1–46.3)
EOSINOPHIL # BLD AUTO: 0.23 X10(3) UL (ref 0–0.7)
EOSINOPHIL NFR BLD AUTO: 2.9 %
ERYTHROCYTE [DISTWIDTH] IN BLOOD BY AUTOMATED COUNT: 13.1 % (ref 11–15)
GLOBULIN PLAS-MCNC: 3.2 G/DL (ref 2.8–4.4)
GLUCOSE BLD-MCNC: 87 MG/DL (ref 70–99)
HCT VFR BLD AUTO: 43 %
HDLC SERPL-MCNC: 52 MG/DL (ref 40–59)
HGB BLD-MCNC: 13.9 G/DL
IMM GRANULOCYTES # BLD AUTO: 0.02 X10(3) UL (ref 0–1)
IMM GRANULOCYTES NFR BLD: 0.3 %
LDLC SERPL CALC-MCNC: 49 MG/DL (ref ?–100)
LYMPHOCYTES # BLD AUTO: 1.84 X10(3) UL (ref 1–4)
LYMPHOCYTES NFR BLD AUTO: 23.6 %
M PROTEIN MFR SERPL ELPH: 7 G/DL (ref 6.4–8.2)
MCH RBC QN AUTO: 29.5 PG (ref 26–34)
MCHC RBC AUTO-ENTMCNC: 32.3 G/DL (ref 31–37)
MCV RBC AUTO: 91.3 FL
MONOCYTES # BLD AUTO: 0.44 X10(3) UL (ref 0.1–1)
MONOCYTES NFR BLD AUTO: 5.6 %
NEUTROPHILS # BLD AUTO: 5.23 X10 (3) UL (ref 1.5–7.7)
NEUTROPHILS # BLD AUTO: 5.23 X10(3) UL (ref 1.5–7.7)
NEUTROPHILS NFR BLD AUTO: 67 %
NONHDLC SERPL-MCNC: 70 MG/DL (ref ?–130)
OSMOLALITY SERPL CALC.SUM OF ELEC: 285 MOSM/KG (ref 275–295)
PATIENT FASTING Y/N/NP: YES
PATIENT FASTING Y/N/NP: YES
PLATELET # BLD AUTO: 351 10(3)UL (ref 150–450)
POTASSIUM SERPL-SCNC: 4.1 MMOL/L (ref 3.5–5.1)
RBC # BLD AUTO: 4.71 X10(6)UL
SODIUM SERPL-SCNC: 139 MMOL/L (ref 136–145)
TRIGL SERPL-MCNC: 116 MG/DL (ref 30–149)
TSI SER-ACNC: 1.38 MIU/ML (ref 0.36–3.74)
VLDLC SERPL CALC-MCNC: 17 MG/DL (ref 0–30)
WBC # BLD AUTO: 7.8 X10(3) UL (ref 4–11)

## 2021-07-22 PROCEDURE — 90471 IMMUNIZATION ADMIN: CPT | Performed by: FAMILY MEDICINE

## 2021-07-22 PROCEDURE — 3080F DIAST BP >= 90 MM HG: CPT | Performed by: FAMILY MEDICINE

## 2021-07-22 PROCEDURE — 90732 PPSV23 VACC 2 YRS+ SUBQ/IM: CPT | Performed by: FAMILY MEDICINE

## 2021-07-22 PROCEDURE — 99396 PREV VISIT EST AGE 40-64: CPT | Performed by: FAMILY MEDICINE

## 2021-07-22 PROCEDURE — 3008F BODY MASS INDEX DOCD: CPT | Performed by: FAMILY MEDICINE

## 2021-07-22 PROCEDURE — 3074F SYST BP LT 130 MM HG: CPT | Performed by: FAMILY MEDICINE

## 2021-07-22 RX ORDER — DOXYCYCLINE HYCLATE 100 MG
100 TABLET ORAL 2 TIMES DAILY
Qty: 20 TABLET | Refills: 0 | Status: SHIPPED | OUTPATIENT
Start: 2021-07-22 | End: 2021-08-01

## 2021-07-22 RX ORDER — LISINOPRIL AND HYDROCHLOROTHIAZIDE 12.5; 1 MG/1; MG/1
1 TABLET ORAL DAILY
Qty: 90 TABLET | Refills: 3 | Status: SHIPPED | OUTPATIENT
Start: 2021-07-22 | End: 2022-07-17

## 2021-07-22 RX ORDER — LISINOPRIL 10 MG/1
TABLET ORAL
Qty: 30 TABLET | Refills: 0 | OUTPATIENT
Start: 2021-07-22

## 2021-07-22 NOTE — PATIENT INSTRUCTIONS
Health Maintenance     Eat healthy well balanced diet - majority should be protein and vegetables  Get at least 150 min of exercise per week (30 min/5 days)  Wear sunscreen - SPF 30 or higher and reapply every 2 hours. Wear seat belts and drive safely.

## 2021-07-22 NOTE — PROGRESS NOTES
Kacie Aparicio is a 42year old female.     CC:  Patient presents with:  Physical      HPI:  Pneumococcal Vaccine: Birth to 64yrs(1 of 1 - PPSV23) Never done  Annual Physical due on 04/19/2019  Asthma Control Test due on 12/26/2019  Annual Depression Scr anything to end your life? (lifetime): No  Score and Suggested Actions - Office Visit: No Risk  Score and Intervention  Score and Suggested Actions - Office Visit: No Risk        Allergies:    Bactrim Ds [Sulfame*    RASH   Current Meds:  MOMETASONE FUROAT Social History    Tobacco Use      Smoking status: Former Smoker        Packs/day: 0.50        Years: 13.00        Pack years: 6.5        Types: Cigarettes        Quit date: 11/1/2010        Years since quitting: 10.7      Smokeless tobacco: Never Used Wt 236 lb (107 kg)   LMP 07/15/2021 (Exact Date)   SpO2 96%   BMI 39.27 kg/m²   Body mass index is 39.27 kg/m². Patient's last menstrual period was 07/15/2021 (exact date).     GENERAL: well developed, well nourished, in no apparent distress  HEENT: atraum total) by mouth 2 (two) times daily for 10 days. Imaging & Consults:  PNEUMOCOCCAL IMM (PNEUMOVAX)    Return in about 3 months (around 10/22/2021) for blood pressure.

## 2021-07-23 ENCOUNTER — TELEPHONE (OUTPATIENT)
Dept: FAMILY MEDICINE CLINIC | Facility: CLINIC | Age: 41
End: 2021-07-23

## 2021-07-23 NOTE — TELEPHONE ENCOUNTER
Pt states she took doxycycline on empty stomach as directed but it made her sick. Pt wants to make sure she can take with food. Ok to leave vm.

## 2021-09-28 ENCOUNTER — OFFICE VISIT (OUTPATIENT)
Dept: FAMILY MEDICINE CLINIC | Facility: CLINIC | Age: 41
End: 2021-09-28
Payer: COMMERCIAL

## 2021-09-28 VITALS
SYSTOLIC BLOOD PRESSURE: 128 MMHG | HEIGHT: 65 IN | TEMPERATURE: 98 F | WEIGHT: 233 LBS | DIASTOLIC BLOOD PRESSURE: 80 MMHG | RESPIRATION RATE: 19 BRPM | BODY MASS INDEX: 38.82 KG/M2 | HEART RATE: 120 BPM | OXYGEN SATURATION: 99 %

## 2021-09-28 DIAGNOSIS — I83.92 ASYMPTOMATIC VARICOSE VEINS OF LEFT LOWER EXTREMITY: Primary | ICD-10-CM

## 2021-09-28 DIAGNOSIS — Z23 NEED FOR VACCINATION: ICD-10-CM

## 2021-09-28 PROCEDURE — 90471 IMMUNIZATION ADMIN: CPT | Performed by: FAMILY MEDICINE

## 2021-09-28 PROCEDURE — 3008F BODY MASS INDEX DOCD: CPT | Performed by: FAMILY MEDICINE

## 2021-09-28 PROCEDURE — 3074F SYST BP LT 130 MM HG: CPT | Performed by: FAMILY MEDICINE

## 2021-09-28 PROCEDURE — 90686 IIV4 VACC NO PRSV 0.5 ML IM: CPT | Performed by: FAMILY MEDICINE

## 2021-09-28 PROCEDURE — 3079F DIAST BP 80-89 MM HG: CPT | Performed by: FAMILY MEDICINE

## 2021-09-28 PROCEDURE — 99213 OFFICE O/P EST LOW 20 MIN: CPT | Performed by: FAMILY MEDICINE

## 2021-09-28 NOTE — PATIENT INSTRUCTIONS
Understanding Spider and Varicose Veins  What are the symptoms? Spider veins or varicose veins may never be a problem. But sometimes they can cause legs to ache or swell. Your legs may also feel heavy and tired. Or they may feel like they’re burning.  Mark Suarez

## 2021-09-29 NOTE — PROGRESS NOTES
Subjective:   Seamus Grady is a 42year old female who presents for Lump (x1 month, left lower leg)     LUMP   Lateral left lower leg. States it looks like a vein is in same area. Noticed in shower.  Denies pain, limitation in function or change in lucius pallor and rash. Neurological: Negative for dizziness and headaches.        Objective:   /80   Pulse 120   Temp 98 °F (36.7 °C)   Resp 19   Ht 5' 5\" (1.651 m)   Wt 233 lb (105.7 kg)   LMP 07/15/2021 (Exact Date)   SpO2 99%   BMI 38.77 kg/m²  Estima

## 2021-10-06 ENCOUNTER — PATIENT MESSAGE (OUTPATIENT)
Dept: FAMILY MEDICINE CLINIC | Facility: CLINIC | Age: 41
End: 2021-10-06

## 2021-10-06 NOTE — TELEPHONE ENCOUNTER
From: Keven Cordero  To: Sofia Crawford MD  Sent: 10/6/2021 1:20 PM CDT  Subject: Booster    Hello: At my last visit Dr. Cristobal Dawkins and I talked briefly about it being okay to wait on my 3rd dose booster shot.  That although I fall in the [facility] \" category the d

## 2021-10-10 ENCOUNTER — OFFICE VISIT (OUTPATIENT)
Dept: FAMILY MEDICINE CLINIC | Facility: CLINIC | Age: 41
End: 2021-10-10
Payer: COMMERCIAL

## 2021-10-10 VITALS
HEIGHT: 65.5 IN | SYSTOLIC BLOOD PRESSURE: 140 MMHG | WEIGHT: 231 LBS | BODY MASS INDEX: 38.02 KG/M2 | DIASTOLIC BLOOD PRESSURE: 80 MMHG | OXYGEN SATURATION: 97 % | HEART RATE: 110 BPM | RESPIRATION RATE: 17 BRPM | TEMPERATURE: 98 F

## 2021-10-10 DIAGNOSIS — H69.82 EUSTACHIAN TUBE DYSFUNCTION, LEFT: Primary | ICD-10-CM

## 2021-10-10 PROCEDURE — 99213 OFFICE O/P EST LOW 20 MIN: CPT | Performed by: PHYSICIAN ASSISTANT

## 2021-10-10 PROCEDURE — 3008F BODY MASS INDEX DOCD: CPT | Performed by: PHYSICIAN ASSISTANT

## 2021-10-10 PROCEDURE — 3079F DIAST BP 80-89 MM HG: CPT | Performed by: PHYSICIAN ASSISTANT

## 2021-10-10 PROCEDURE — 3077F SYST BP >= 140 MM HG: CPT | Performed by: PHYSICIAN ASSISTANT

## 2021-10-10 NOTE — PROGRESS NOTES
CHIEF COMPLAINT:   Patient presents with:  Ear Pain      HPI:   Iwona Weaver is a 42year old female who presents to clinic today with complaints of left ear pain for 3 days. Mild pain. No drainage or discharge. No hearing changes. No tinnitus.  No di wheezing. CARDIOVASCULAR: No chest pain, palpitations  GI: No N/V/C/D.   NEURO: denies headaches or dizziness    EXAM:   /80 (BP Location: Left arm, Patient Position: Sitting, Cuff Size: large)   Pulse 110   Temp 97.9 °F (36.6 °C) (Temporal)   Resp 1

## 2021-10-10 NOTE — PATIENT INSTRUCTIONS
Continue nasal spray and antihistamine   Please follow up with PCP if no improvement or if symptoms worsen

## 2021-10-13 RX ORDER — ROSUVASTATIN CALCIUM 5 MG/1
TABLET, COATED ORAL
Qty: 90 TABLET | Refills: 1 | Status: SHIPPED | OUTPATIENT
Start: 2021-10-13

## 2021-10-20 RX ORDER — LEVOCETIRIZINE DIHYDROCHLORIDE 5 MG/1
TABLET, FILM COATED ORAL
Qty: 90 TABLET | Refills: 3 | Status: SHIPPED | OUTPATIENT
Start: 2021-10-20

## 2021-10-20 RX ORDER — LISINOPRIL 10 MG/1
TABLET ORAL
Qty: 30 TABLET | Refills: 0 | OUTPATIENT
Start: 2021-10-20

## 2021-10-20 NOTE — TELEPHONE ENCOUNTER
Medication was discontinued 7/22/2021 by Dr. Ashwini Rubio. Combo Lisinopril-hydroCHLOROthiazide was given to Pt.

## 2022-01-31 ENCOUNTER — PATIENT MESSAGE (OUTPATIENT)
Dept: FAMILY MEDICINE CLINIC | Facility: CLINIC | Age: 42
End: 2022-01-31

## 2022-04-19 RX ORDER — ROSUVASTATIN CALCIUM 5 MG/1
TABLET, COATED ORAL
Qty: 90 TABLET | Refills: 1 | Status: SHIPPED | OUTPATIENT
Start: 2022-04-19

## 2022-07-18 RX ORDER — LISINOPRIL AND HYDROCHLOROTHIAZIDE 12.5; 1 MG/1; MG/1
1 TABLET ORAL DAILY
Qty: 90 TABLET | Refills: 0 | Status: SHIPPED
Start: 2022-07-18 | End: 2022-07-29

## 2022-07-20 RX ORDER — LISINOPRIL AND HYDROCHLOROTHIAZIDE 12.5; 1 MG/1; MG/1
TABLET ORAL
Qty: 90 TABLET | Refills: 3 | OUTPATIENT
Start: 2022-07-20

## 2022-07-23 ENCOUNTER — MED REC SCAN ONLY (OUTPATIENT)
Dept: FAMILY MEDICINE CLINIC | Facility: CLINIC | Age: 42
End: 2022-07-23

## 2022-07-29 ENCOUNTER — TELEPHONE (OUTPATIENT)
Dept: FAMILY MEDICINE CLINIC | Facility: CLINIC | Age: 42
End: 2022-07-29

## 2022-07-29 RX ORDER — LISINOPRIL AND HYDROCHLOROTHIAZIDE 12.5; 1 MG/1; MG/1
TABLET ORAL
Qty: 90 TABLET | Refills: 3 | OUTPATIENT
Start: 2022-07-29

## 2022-07-29 RX ORDER — LISINOPRIL AND HYDROCHLOROTHIAZIDE 12.5; 1 MG/1; MG/1
1 TABLET ORAL DAILY
Qty: 90 TABLET | Refills: 0 | Status: SHIPPED | OUTPATIENT
Start: 2022-07-29

## 2022-07-29 NOTE — TELEPHONE ENCOUNTER
Requesting refill on lisinopril-hydrochlorothiazide. LOV 9/28/2021. Appointment scheduled for 9/2/2022. Rx on 7/18/2022 failed. Rx sent to pharmacy.

## 2022-07-29 NOTE — TELEPHONE ENCOUNTER
Patient is having trouble getting this medication     lisinopril-hydroCHLOROthiazide 10-12.5 MG Oral Tab    It shows on 7/18/22, it was escribed but it looks like the transmission failed     Then duplicate requests coming in, but denied as duplicate

## 2022-08-01 RX ORDER — LISINOPRIL AND HYDROCHLOROTHIAZIDE 12.5; 1 MG/1; MG/1
TABLET ORAL
Qty: 90 TABLET | Refills: 3 | OUTPATIENT
Start: 2022-08-01

## 2022-10-03 ENCOUNTER — TELEMEDICINE (OUTPATIENT)
Dept: FAMILY MEDICINE CLINIC | Facility: CLINIC | Age: 42
End: 2022-10-03

## 2022-10-03 DIAGNOSIS — J45.30 MILD PERSISTENT ASTHMA WITHOUT COMPLICATION: ICD-10-CM

## 2022-10-03 DIAGNOSIS — I10 ESSENTIAL HYPERTENSION: ICD-10-CM

## 2022-10-03 DIAGNOSIS — U07.1 COVID-19: Primary | ICD-10-CM

## 2022-10-03 RX ORDER — ALBUTEROL SULFATE 90 UG/1
2 AEROSOL, METERED RESPIRATORY (INHALATION) EVERY 4 HOURS PRN
Qty: 8 G | Refills: 2 | Status: SHIPPED | OUTPATIENT
Start: 2022-10-03

## 2022-10-03 RX ORDER — NIRMATRELVIR AND RITONAVIR 300-100 MG
KIT ORAL
Qty: 30 TABLET | Refills: 0 | Status: SHIPPED | OUTPATIENT
Start: 2022-10-03 | End: 2022-10-08

## 2022-10-06 ENCOUNTER — PATIENT MESSAGE (OUTPATIENT)
Dept: FAMILY MEDICINE CLINIC | Facility: CLINIC | Age: 42
End: 2022-10-06

## 2022-10-06 NOTE — TELEPHONE ENCOUNTER
Pt has 3 doses of Paxlovid left but feels it is making her sick to her stomach. Feeling better from COVID. OK to stop Paxlovid?

## 2022-10-10 ENCOUNTER — TELEPHONE (OUTPATIENT)
Dept: FAMILY MEDICINE CLINIC | Facility: CLINIC | Age: 42
End: 2022-10-10

## 2022-10-10 NOTE — TELEPHONE ENCOUNTER
Patient tested positive for Covid on 10/3/2022. Was prescribed Paxlovid. Stopped Paxlovid on 10/6/2022 because of side effect of upset stomach. All symptoms resolved; however, still experiencing nausea and loss of taste/smell. Only time that patient vomited was on 10/6/2022 because the iced tea she was drinking was \"slimy. \"   Maynor Early had loose stool, but only one BM today. Denies blood in stool or abdominal pain. Last time patient was nauseous was last night after eating chicken tenders. Had toast this morning and no nausea. Educated on bland diet and hydration. Patient verbalized understanding. Stated that she is making sure that she stays hydrated. (Drinking a lot of water.) Will do BRAT diet and call office back on Wednesday if no improvement.

## 2022-10-10 NOTE — TELEPHONE ENCOUNTER
Patient had covid, was prescribed Paxlovid, last dose was on Thursday, patient did not take the full 5 days. Patient's symptoms have resolved but she is unable to eat, everything makes her nauseas. Patient would like to speak with RN regarding this. Please Advise. Thank you.

## 2022-10-12 ENCOUNTER — PATIENT MESSAGE (OUTPATIENT)
Dept: FAMILY MEDICINE CLINIC | Facility: CLINIC | Age: 42
End: 2022-10-12

## 2022-10-12 NOTE — TELEPHONE ENCOUNTER
From: Tommy Huff  Sent: 10/12/2022 9:35 AM CDT  To: [facility] Clinical Staff  Subject: paxlovid     Thank you. To make sure i understand correctly, i do not need to re-quarantine for five days etc.?   I can continue on with time frame of when originally tested positive? Yes

## 2022-10-12 NOTE — TELEPHONE ENCOUNTER
Confirmed no need to restart quarantine unless symptoms present anew. Also informed to not get flu vax or covid booster until feeling fully recovered.

## 2022-10-21 ENCOUNTER — OFFICE VISIT (OUTPATIENT)
Dept: FAMILY MEDICINE CLINIC | Facility: CLINIC | Age: 42
End: 2022-10-21
Payer: COMMERCIAL

## 2022-10-21 ENCOUNTER — LAB ENCOUNTER (OUTPATIENT)
Dept: LAB | Age: 42
End: 2022-10-21
Attending: FAMILY MEDICINE
Payer: COMMERCIAL

## 2022-10-21 VITALS
DIASTOLIC BLOOD PRESSURE: 72 MMHG | SYSTOLIC BLOOD PRESSURE: 120 MMHG | WEIGHT: 226 LBS | RESPIRATION RATE: 16 BRPM | TEMPERATURE: 98 F | BODY MASS INDEX: 37.65 KG/M2 | HEIGHT: 65 IN | HEART RATE: 110 BPM | OXYGEN SATURATION: 98 %

## 2022-10-21 DIAGNOSIS — I10 ESSENTIAL HYPERTENSION: ICD-10-CM

## 2022-10-21 DIAGNOSIS — Z23 NEED FOR VACCINATION: ICD-10-CM

## 2022-10-21 DIAGNOSIS — Z23 NEED FOR TDAP VACCINATION: ICD-10-CM

## 2022-10-21 DIAGNOSIS — Z00.00 ROUTINE PHYSICAL EXAMINATION: Primary | ICD-10-CM

## 2022-10-21 DIAGNOSIS — J45.30 MILD PERSISTENT ASTHMA WITHOUT COMPLICATION: ICD-10-CM

## 2022-10-21 DIAGNOSIS — Z00.00 LABORATORY EXAMINATION ORDERED AS PART OF A ROUTINE GENERAL MEDICAL EXAMINATION: ICD-10-CM

## 2022-10-21 DIAGNOSIS — R07.89 CHEST PRESSURE: ICD-10-CM

## 2022-10-21 LAB
ALBUMIN SERPL-MCNC: 4 G/DL (ref 3.4–5)
ALBUMIN/GLOB SERPL: 1.1 {RATIO} (ref 1–2)
ALP LIVER SERPL-CCNC: 76 U/L
ALT SERPL-CCNC: 34 U/L
ANION GAP SERPL CALC-SCNC: 7 MMOL/L (ref 0–18)
AST SERPL-CCNC: 13 U/L (ref 15–37)
BASOPHILS # BLD AUTO: 0.06 X10(3) UL (ref 0–0.2)
BASOPHILS NFR BLD AUTO: 0.7 %
BILIRUB SERPL-MCNC: 0.8 MG/DL (ref 0.1–2)
BUN BLD-MCNC: 8 MG/DL (ref 7–18)
CALCIUM BLD-MCNC: 10.2 MG/DL (ref 8.5–10.1)
CHLORIDE SERPL-SCNC: 106 MMOL/L (ref 98–112)
CHOLEST SERPL-MCNC: 177 MG/DL (ref ?–200)
CO2 SERPL-SCNC: 27 MMOL/L (ref 21–32)
CREAT BLD-MCNC: 0.86 MG/DL
D DIMER PPP FEU-MCNC: <0.27 UG/ML FEU (ref ?–0.5)
EOSINOPHIL # BLD AUTO: 0.2 X10(3) UL (ref 0–0.7)
EOSINOPHIL NFR BLD AUTO: 2.5 %
ERYTHROCYTE [DISTWIDTH] IN BLOOD BY AUTOMATED COUNT: 12.5 %
FASTING PATIENT LIPID ANSWER: YES
FASTING STATUS PATIENT QL REPORTED: YES
GFR SERPLBLD BASED ON 1.73 SQ M-ARVRAT: 86 ML/MIN/1.73M2 (ref 60–?)
GLOBULIN PLAS-MCNC: 3.8 G/DL (ref 2.8–4.4)
GLUCOSE BLD-MCNC: 103 MG/DL (ref 70–99)
HCT VFR BLD AUTO: 44.8 %
HDLC SERPL-MCNC: 48 MG/DL (ref 40–59)
HGB BLD-MCNC: 14.2 G/DL
IMM GRANULOCYTES # BLD AUTO: 0.02 X10(3) UL (ref 0–1)
IMM GRANULOCYTES NFR BLD: 0.2 %
LDLC SERPL CALC-MCNC: 94 MG/DL (ref ?–100)
LYMPHOCYTES # BLD AUTO: 2.1 X10(3) UL (ref 1–4)
LYMPHOCYTES NFR BLD AUTO: 25.8 %
MCH RBC QN AUTO: 29.7 PG (ref 26–34)
MCHC RBC AUTO-ENTMCNC: 31.7 G/DL (ref 31–37)
MCV RBC AUTO: 93.7 FL
MONOCYTES # BLD AUTO: 0.47 X10(3) UL (ref 0.1–1)
MONOCYTES NFR BLD AUTO: 5.8 %
NEUTROPHILS # BLD AUTO: 5.29 X10 (3) UL (ref 1.5–7.7)
NEUTROPHILS # BLD AUTO: 5.29 X10(3) UL (ref 1.5–7.7)
NEUTROPHILS NFR BLD AUTO: 65 %
NONHDLC SERPL-MCNC: 129 MG/DL (ref ?–130)
OSMOLALITY SERPL CALC.SUM OF ELEC: 289 MOSM/KG (ref 275–295)
PLATELET # BLD AUTO: 398 10(3)UL (ref 150–450)
POTASSIUM SERPL-SCNC: 4.1 MMOL/L (ref 3.5–5.1)
PROT SERPL-MCNC: 7.8 G/DL (ref 6.4–8.2)
RBC # BLD AUTO: 4.78 X10(6)UL
SODIUM SERPL-SCNC: 140 MMOL/L (ref 136–145)
TRIGL SERPL-MCNC: 204 MG/DL (ref 30–149)
TSI SER-ACNC: 1.69 MIU/ML (ref 0.36–3.74)
VLDLC SERPL CALC-MCNC: 34 MG/DL (ref 0–30)
WBC # BLD AUTO: 8.1 X10(3) UL (ref 4–11)

## 2022-10-21 PROCEDURE — 85379 FIBRIN DEGRADATION QUANT: CPT | Performed by: FAMILY MEDICINE

## 2022-10-21 PROCEDURE — 90471 IMMUNIZATION ADMIN: CPT | Performed by: FAMILY MEDICINE

## 2022-10-21 PROCEDURE — 3078F DIAST BP <80 MM HG: CPT | Performed by: FAMILY MEDICINE

## 2022-10-21 PROCEDURE — 99396 PREV VISIT EST AGE 40-64: CPT | Performed by: FAMILY MEDICINE

## 2022-10-21 PROCEDURE — 90472 IMMUNIZATION ADMIN EACH ADD: CPT | Performed by: FAMILY MEDICINE

## 2022-10-21 PROCEDURE — 90686 IIV4 VACC NO PRSV 0.5 ML IM: CPT | Performed by: FAMILY MEDICINE

## 2022-10-21 PROCEDURE — 90715 TDAP VACCINE 7 YRS/> IM: CPT | Performed by: FAMILY MEDICINE

## 2022-10-21 PROCEDURE — 80061 LIPID PANEL: CPT | Performed by: FAMILY MEDICINE

## 2022-10-21 PROCEDURE — 3074F SYST BP LT 130 MM HG: CPT | Performed by: FAMILY MEDICINE

## 2022-10-21 PROCEDURE — 80050 GENERAL HEALTH PANEL: CPT | Performed by: FAMILY MEDICINE

## 2022-10-21 PROCEDURE — 3008F BODY MASS INDEX DOCD: CPT | Performed by: FAMILY MEDICINE

## 2022-10-21 RX ORDER — PANTOPRAZOLE SODIUM 40 MG/1
40 TABLET, DELAYED RELEASE ORAL
Qty: 30 TABLET | Refills: 0 | Status: SHIPPED | OUTPATIENT
Start: 2022-10-21 | End: 2022-11-20

## 2022-10-21 NOTE — PATIENT INSTRUCTIONS
Health Maintenance    AIM FOR 1400 JV PER DAY (or less)     Health and Safety   Eat healthy well balanced diet - majority should be protein and vegetables  Get at least 150 min of exercise per week (30 min/5 days)  Wear sunscreen - SPF 30 or higher and reapply every 2 hours. Wear seat belts and drive safely. Schedule regular appointments with dentist.  Schedule yearly eye exam if you wear glasses/contacts. Vaccinations   Yearly Flu Vaccine recommended for everyone over the age of 7 months   Tetanus, Diptheria and Pertussis vaccine should be given to adults every 7-10 years. Adults 50+ are recommended to get shingles vaccine, Shingrix (2 doses  by 2-6 months). Covid-19 vaccine is recommended.  It is safe and effective at decreasing the risk of disease and complications (including need for mechanical ventilation and death)  Pneumonia vaccines (Pneumovax 23 and Prevnar 13) are recommended for all adults 65+    Colon Cancer screening  The American Cancer Society recommends screening adults 45 and over

## 2022-10-24 RX ORDER — ROSUVASTATIN CALCIUM 5 MG/1
TABLET, COATED ORAL
Qty: 90 TABLET | Refills: 1 | Status: SHIPPED | OUTPATIENT
Start: 2022-10-24

## 2022-10-24 RX ORDER — LEVOCETIRIZINE DIHYDROCHLORIDE 5 MG/1
TABLET, FILM COATED ORAL
Qty: 90 TABLET | Refills: 3 | Status: SHIPPED | OUTPATIENT
Start: 2022-10-24

## 2022-10-28 RX ORDER — LISINOPRIL AND HYDROCHLOROTHIAZIDE 12.5; 1 MG/1; MG/1
TABLET ORAL
Qty: 90 TABLET | Refills: 0 | Status: SHIPPED | OUTPATIENT
Start: 2022-10-28

## 2022-11-04 RX ORDER — PANTOPRAZOLE SODIUM 40 MG/1
40 TABLET, DELAYED RELEASE ORAL
Qty: 30 TABLET | Refills: 0 | Status: CANCELLED | OUTPATIENT
Start: 2022-11-04 | End: 2022-12-04

## 2022-11-07 ENCOUNTER — TELEPHONE (OUTPATIENT)
Dept: FAMILY MEDICINE CLINIC | Facility: CLINIC | Age: 42
End: 2022-11-07

## 2022-11-08 RX ORDER — PANTOPRAZOLE SODIUM 40 MG/1
40 TABLET, DELAYED RELEASE ORAL
Qty: 90 TABLET | Refills: 1 | Status: SHIPPED | OUTPATIENT
Start: 2022-11-08

## 2023-01-19 ENCOUNTER — TELEPHONE (OUTPATIENT)
Dept: FAMILY MEDICINE CLINIC | Facility: CLINIC | Age: 43
End: 2023-01-19

## 2023-01-19 NOTE — TELEPHONE ENCOUNTER
Detailed message left on pt's VM per [facility] to go to UC/IC near her to be evaluated & see if treatment is needed. Call office back with any questions.

## 2023-01-19 NOTE — TELEPHONE ENCOUNTER
Pt called and is traveling for work. She thinks she might have a hair boil on her bikini line and it has broke open and it was bleeding. Pt wants to know what she should do. She is out of [place] at this time. Pls call pt.

## 2023-01-23 ENCOUNTER — MED REC SCAN ONLY (OUTPATIENT)
Dept: FAMILY MEDICINE CLINIC | Facility: CLINIC | Age: 43
End: 2023-01-23

## 2023-01-30 RX ORDER — LISINOPRIL AND HYDROCHLOROTHIAZIDE 12.5; 1 MG/1; MG/1
TABLET ORAL
Qty: 90 TABLET | Refills: 0 | Status: SHIPPED | OUTPATIENT
Start: 2023-01-30

## 2023-03-13 ENCOUNTER — TELEPHONE (OUTPATIENT)
Dept: FAMILY MEDICINE CLINIC | Facility: CLINIC | Age: 43
End: 2023-03-13

## 2023-03-13 ENCOUNTER — PATIENT MESSAGE (OUTPATIENT)
Dept: FAMILY MEDICINE CLINIC | Facility: CLINIC | Age: 43
End: 2023-03-13

## 2023-03-13 NOTE — TELEPHONE ENCOUNTER
Called the patient for more information. Patient states that she saw the dermatologist for a yearly skin check. Patient had a punch biopsy above belly button. Punch biopsy required 7 stiches. One or two of the stiches broke open. Wound was not healing. Went back to dermatologist for F/U. Completed wound culture. Showed staph infection. Prescribed a 10 day course antibiotic. Will be following up with dermatology on Wednesday.  (Did not remember the name of the medication.)

## 2023-03-13 NOTE — TELEPHONE ENCOUNTER
Received a fax from [facility] of a wound culture of right central mid abdomen (above umbilical). Completed on 3/6/2023. Results showed that there was light shon of staphylococcus lugdunensis. Bacteria sensitive to ciprofloxacin, gentamicin, levofloxacin, oxacillin, tetracycline, trimethoprim/sulfa, and vancomycin. PCP reviewed the results and would like to know if the patient was prescribed an antibiotic.

## 2023-05-01 RX ORDER — LISINOPRIL AND HYDROCHLOROTHIAZIDE 12.5; 1 MG/1; MG/1
TABLET ORAL
Qty: 90 TABLET | Refills: 0 | Status: SHIPPED | OUTPATIENT
Start: 2023-05-01

## 2023-05-01 RX ORDER — ROSUVASTATIN CALCIUM 5 MG/1
TABLET, COATED ORAL
Qty: 90 TABLET | Refills: 1 | Status: SHIPPED | OUTPATIENT
Start: 2023-05-01

## 2023-05-23 ENCOUNTER — OFFICE VISIT (OUTPATIENT)
Dept: FAMILY MEDICINE CLINIC | Facility: CLINIC | Age: 43
End: 2023-05-23
Payer: COMMERCIAL

## 2023-05-23 ENCOUNTER — TELEPHONE (OUTPATIENT)
Dept: FAMILY MEDICINE CLINIC | Facility: CLINIC | Age: 43
End: 2023-05-23

## 2023-05-23 VITALS
HEIGHT: 65 IN | OXYGEN SATURATION: 98 % | DIASTOLIC BLOOD PRESSURE: 76 MMHG | SYSTOLIC BLOOD PRESSURE: 110 MMHG | RESPIRATION RATE: 16 BRPM | HEART RATE: 87 BPM | WEIGHT: 233 LBS | TEMPERATURE: 98 F | BODY MASS INDEX: 38.82 KG/M2

## 2023-05-23 DIAGNOSIS — L30.9 DERMATITIS: Primary | ICD-10-CM

## 2023-05-23 DIAGNOSIS — Z00.00 LABORATORY EXAMINATION ORDERED AS PART OF A ROUTINE GENERAL MEDICAL EXAMINATION: ICD-10-CM

## 2023-05-23 DIAGNOSIS — J45.30 MILD PERSISTENT ASTHMA WITHOUT COMPLICATION: ICD-10-CM

## 2023-05-23 DIAGNOSIS — Z12.31 ENCOUNTER FOR SCREENING MAMMOGRAM FOR MALIGNANT NEOPLASM OF BREAST: ICD-10-CM

## 2023-05-23 PROCEDURE — 3008F BODY MASS INDEX DOCD: CPT | Performed by: FAMILY MEDICINE

## 2023-05-23 PROCEDURE — 3078F DIAST BP <80 MM HG: CPT | Performed by: FAMILY MEDICINE

## 2023-05-23 PROCEDURE — 99214 OFFICE O/P EST MOD 30 MIN: CPT | Performed by: FAMILY MEDICINE

## 2023-05-23 PROCEDURE — 3074F SYST BP LT 130 MM HG: CPT | Performed by: FAMILY MEDICINE

## 2023-05-23 RX ORDER — PREDNISONE 20 MG/1
40 TABLET ORAL DAILY
Qty: 10 TABLET | Refills: 0 | Status: SHIPPED | OUTPATIENT
Start: 2023-05-23 | End: 2023-05-28

## 2023-05-23 RX ORDER — ALBUTEROL SULFATE 90 UG/1
2 AEROSOL, METERED RESPIRATORY (INHALATION) EVERY 4 HOURS PRN
Qty: 8 G | Refills: 2 | Status: SHIPPED | OUTPATIENT
Start: 2023-05-23

## 2023-05-23 NOTE — TELEPHONE ENCOUNTER
Pt has a question about predniSONE 20 MG Oral Tab   She said usually when she's on it she needs to taper off at the end. The instructions do not indicate that. Pls call pt.

## 2023-05-23 NOTE — TELEPHONE ENCOUNTER
Notified the patient that PCP ordered prednisone 40mg daily for 5 days (steroid burst), not tapering dose. Patient verbalized understanding. Answered all questions at this time.

## 2023-05-23 NOTE — PATIENT INSTRUCTIONS
AmLactin - lotion with salicyclic acid - use 2-3 times per week      Prednisone tablets     Triamcinolone steroid cream

## 2023-07-29 RX ORDER — LISINOPRIL AND HYDROCHLOROTHIAZIDE 12.5; 1 MG/1; MG/1
TABLET ORAL
Qty: 90 TABLET | Refills: 0 | Status: SHIPPED | OUTPATIENT
Start: 2023-07-29

## 2023-09-29 ENCOUNTER — LAB ENCOUNTER (OUTPATIENT)
Dept: LAB | Age: 43
End: 2023-09-29
Attending: FAMILY MEDICINE
Payer: COMMERCIAL

## 2023-09-29 DIAGNOSIS — Z00.00 LABORATORY EXAMINATION ORDERED AS PART OF A ROUTINE GENERAL MEDICAL EXAMINATION: ICD-10-CM

## 2023-09-29 DIAGNOSIS — E78.2 HYPERLIPEMIA, MIXED: ICD-10-CM

## 2023-09-29 LAB
ALBUMIN SERPL-MCNC: 3.7 G/DL (ref 3.4–5)
ALBUMIN/GLOB SERPL: 1 {RATIO} (ref 1–2)
ALP LIVER SERPL-CCNC: 72 U/L
ALT SERPL-CCNC: 25 U/L
ANION GAP SERPL CALC-SCNC: 5 MMOL/L (ref 0–18)
AST SERPL-CCNC: 18 U/L (ref 15–37)
BASOPHILS # BLD AUTO: 0.04 X10(3) UL (ref 0–0.2)
BASOPHILS NFR BLD AUTO: 0.5 %
BILIRUB SERPL-MCNC: 0.8 MG/DL (ref 0.1–2)
BUN BLD-MCNC: 10 MG/DL (ref 7–18)
CALCIUM BLD-MCNC: 9.4 MG/DL (ref 8.5–10.1)
CHLORIDE SERPL-SCNC: 106 MMOL/L (ref 98–112)
CHOLEST SERPL-MCNC: 149 MG/DL (ref ?–200)
CO2 SERPL-SCNC: 27 MMOL/L (ref 21–32)
CREAT BLD-MCNC: 0.77 MG/DL
EGFRCR SERPLBLD CKD-EPI 2021: 97 ML/MIN/1.73M2 (ref 60–?)
EOSINOPHIL # BLD AUTO: 0.12 X10(3) UL (ref 0–0.7)
EOSINOPHIL NFR BLD AUTO: 1.6 %
ERYTHROCYTE [DISTWIDTH] IN BLOOD BY AUTOMATED COUNT: 12.8 %
FASTING PATIENT LIPID ANSWER: YES
FASTING STATUS PATIENT QL REPORTED: YES
GLOBULIN PLAS-MCNC: 3.8 G/DL (ref 2.8–4.4)
GLUCOSE BLD-MCNC: 99 MG/DL (ref 70–99)
HCT VFR BLD AUTO: 44.1 %
HDLC SERPL-MCNC: 55 MG/DL (ref 40–59)
HGB BLD-MCNC: 14.2 G/DL
IMM GRANULOCYTES # BLD AUTO: 0.03 X10(3) UL (ref 0–1)
IMM GRANULOCYTES NFR BLD: 0.4 %
LDLC SERPL CALC-MCNC: 71 MG/DL (ref ?–100)
LYMPHOCYTES # BLD AUTO: 1.99 X10(3) UL (ref 1–4)
LYMPHOCYTES NFR BLD AUTO: 26.7 %
MCH RBC QN AUTO: 30 PG (ref 26–34)
MCHC RBC AUTO-ENTMCNC: 32.2 G/DL (ref 31–37)
MCV RBC AUTO: 93 FL
MONOCYTES # BLD AUTO: 0.42 X10(3) UL (ref 0.1–1)
MONOCYTES NFR BLD AUTO: 5.6 %
NEUTROPHILS # BLD AUTO: 4.84 X10 (3) UL (ref 1.5–7.7)
NEUTROPHILS # BLD AUTO: 4.84 X10(3) UL (ref 1.5–7.7)
NEUTROPHILS NFR BLD AUTO: 65.2 %
NONHDLC SERPL-MCNC: 94 MG/DL (ref ?–130)
OSMOLALITY SERPL CALC.SUM OF ELEC: 285 MOSM/KG (ref 275–295)
PLATELET # BLD AUTO: 345 10(3)UL (ref 150–450)
POTASSIUM SERPL-SCNC: 4.5 MMOL/L (ref 3.5–5.1)
PROT SERPL-MCNC: 7.5 G/DL (ref 6.4–8.2)
RBC # BLD AUTO: 4.74 X10(6)UL
SODIUM SERPL-SCNC: 138 MMOL/L (ref 136–145)
TRIGL SERPL-MCNC: 131 MG/DL (ref 30–149)
TSI SER-ACNC: 1.31 MIU/ML (ref 0.36–3.74)
VLDLC SERPL CALC-MCNC: 20 MG/DL (ref 0–30)
WBC # BLD AUTO: 7.4 X10(3) UL (ref 4–11)

## 2023-09-29 PROCEDURE — 80050 GENERAL HEALTH PANEL: CPT | Performed by: FAMILY MEDICINE

## 2023-09-29 PROCEDURE — 80061 LIPID PANEL: CPT | Performed by: FAMILY MEDICINE

## 2023-10-05 ENCOUNTER — OFFICE VISIT (OUTPATIENT)
Dept: FAMILY MEDICINE CLINIC | Facility: CLINIC | Age: 43
End: 2023-10-05
Payer: COMMERCIAL

## 2023-10-05 VITALS
TEMPERATURE: 98 F | WEIGHT: 223 LBS | SYSTOLIC BLOOD PRESSURE: 110 MMHG | OXYGEN SATURATION: 100 % | HEIGHT: 65 IN | DIASTOLIC BLOOD PRESSURE: 72 MMHG | HEART RATE: 90 BPM | RESPIRATION RATE: 16 BRPM | BODY MASS INDEX: 37.15 KG/M2

## 2023-10-05 DIAGNOSIS — J45.30 MILD PERSISTENT ASTHMA WITHOUT COMPLICATION: ICD-10-CM

## 2023-10-05 DIAGNOSIS — E78.2 MIXED HYPERLIPIDEMIA: ICD-10-CM

## 2023-10-05 DIAGNOSIS — Z23 NEED FOR VACCINATION: ICD-10-CM

## 2023-10-05 DIAGNOSIS — I10 ESSENTIAL HYPERTENSION: ICD-10-CM

## 2023-10-05 DIAGNOSIS — Z00.00 ROUTINE PHYSICAL EXAMINATION: Primary | ICD-10-CM

## 2023-10-05 PROCEDURE — 90686 IIV4 VACC NO PRSV 0.5 ML IM: CPT | Performed by: FAMILY MEDICINE

## 2023-10-05 PROCEDURE — 3078F DIAST BP <80 MM HG: CPT | Performed by: FAMILY MEDICINE

## 2023-10-05 PROCEDURE — 3008F BODY MASS INDEX DOCD: CPT | Performed by: FAMILY MEDICINE

## 2023-10-05 PROCEDURE — 99396 PREV VISIT EST AGE 40-64: CPT | Performed by: FAMILY MEDICINE

## 2023-10-05 PROCEDURE — 90471 IMMUNIZATION ADMIN: CPT | Performed by: FAMILY MEDICINE

## 2023-10-05 PROCEDURE — 3074F SYST BP LT 130 MM HG: CPT | Performed by: FAMILY MEDICINE

## 2023-10-05 RX ORDER — LISINOPRIL AND HYDROCHLOROTHIAZIDE 12.5; 1 MG/1; MG/1
1 TABLET ORAL DAILY
Qty: 90 TABLET | Refills: 3 | Status: SHIPPED | OUTPATIENT
Start: 2023-10-05

## 2023-10-05 RX ORDER — ALBUTEROL SULFATE 90 UG/1
2 AEROSOL, METERED RESPIRATORY (INHALATION) EVERY 4 HOURS PRN
Qty: 8 G | Refills: 2 | Status: SHIPPED | OUTPATIENT
Start: 2023-10-05

## 2023-10-05 RX ORDER — ROSUVASTATIN CALCIUM 5 MG/1
5 TABLET, COATED ORAL NIGHTLY
Qty: 90 TABLET | Refills: 3 | Status: SHIPPED | OUTPATIENT
Start: 2023-10-05

## 2023-10-27 RX ORDER — LEVOCETIRIZINE DIHYDROCHLORIDE 5 MG/1
TABLET, FILM COATED ORAL
Qty: 90 TABLET | Refills: 3 | Status: SHIPPED
Start: 2023-10-27

## 2024-01-17 ENCOUNTER — TELEPHONE (OUTPATIENT)
Dept: FAMILY MEDICINE CLINIC | Facility: CLINIC | Age: 44
End: 2024-01-17

## 2024-01-17 DIAGNOSIS — Z12.11 COLON CANCER SCREENING: Primary | ICD-10-CM

## 2024-01-17 NOTE — TELEPHONE ENCOUNTER
Pt received letter for colon cancer screening. Pt asking for orders to proceed with that screening.

## 2024-01-18 NOTE — TELEPHONE ENCOUNTER
Please see below message & advise on what type of colon cancer screening you would like pt to have.

## 2024-01-30 ENCOUNTER — OFFICE VISIT (OUTPATIENT)
Facility: LOCATION | Age: 44
End: 2024-01-30
Payer: COMMERCIAL

## 2024-01-30 VITALS — TEMPERATURE: 97 F | HEART RATE: 99 BPM

## 2024-01-30 DIAGNOSIS — Z12.11 ENCOUNTER FOR SCREENING COLONOSCOPY: Primary | ICD-10-CM

## 2024-01-30 PROCEDURE — S0285 CNSLT BEFORE SCREEN COLONOSC: HCPCS | Performed by: STUDENT IN AN ORGANIZED HEALTH CARE EDUCATION/TRAINING PROGRAM

## 2024-01-30 NOTE — PROGRESS NOTES
New Patient Visit Note       Active Problems      1. Encounter for screening colonoscopy        Chief Complaint   Chief Complaint   Patient presents with    New Patient     NP - Csccope - No family history, Pt c/o occasional rectal bleeding       PCP  MEHDI HERNANDEZ MD     History of Present Illness   Joaquina Monique is a 44 year old female who presents for evaluation for colonoscopy.    The patient has not had a previous colonoscopy. The patient notes that for the past few years, she has had some rare painless rectal bleeding episodes when wiping. She characterizes it as bright red in color. These episodes have increased slightly these past 2 months occurring every couple weeks. The patient states that she notices it most after strenuous exercise like lifting weights or shoveling snow. Otherwise, she denies any changes in her bowel habits, stool caliber, and consistency. Pt also notes an occasional burning sensation in the periumbilical area and further downwards when she lays down.    The patient's family history is negative for gastric and colorectal malignancies    The patient denies nausea, vomiting, abdominal pain or cramping or bloating, diarrhea, constipation, dark tarry stools, other recent changes in bowel habits, or recent weight loss.    The patient's past medical history includes Allergic rhinitis, Asthma, Esophageal reflux, Essential HTN, HLD, and obesity.    The patient's past surgical history includes tonsillectomy 01/01/1998     The patient takes no blood thinners.      Past Medical / Surgical / Social / Family History    The past medical history, past surgical history, family history, social history, allergies, and medications have been reviewed by me today.    Current Outpatient Medications on File Prior to Visit   Medication Sig    LEVOCETIRIZINE 5 MG Oral Tab TAKE 1 TABLET BY MOUTH EVERY DAY IN THE EVENING    lisinopril-hydroCHLOROthiazide 10-12.5 MG Oral Tab Take 1 tablet by mouth daily.     albuterol 108 (90 Base) MCG/ACT Inhalation Aero Soln Inhale 2 puffs into the lungs every 4 (four) hours as needed for Wheezing or Shortness of Breath.    rosuvastatin 5 MG Oral Tab Take 1 tablet (5 mg total) by mouth nightly.    Emollient (COLLAGEN EX) Apply topically.    diphenhydrAMINE HCl (BENADRYL ALLERGY OR) Take by mouth.    Multiple Vitamin (MULTI-VITAMIN DAILY OR) Take  by mouth.     No current facility-administered medications on file prior to visit.        Review of Systems  Constitutional: No Fever, No Chills, No Diaphoresis, No fatigue, No weight loss, No anorexia  Eyes: No burry vision, No icterus  ENT: No tinnitus, No rhinorrhea, No dysphagia, No odynophagia  Respiratory: No dyspnea, No wheezing  Cardiovascular: No chest pain, No palpitations, No leg swelling  Gastrointestinal: No abdominal pain, No abdominal distention, No bloating, No nausea, No vomiting, No diarrhea, No constipation, No melena, No hematochezia  Endocrine: No polydipsia, No polyuria  Genitoruinary: No dysuria, No Hematuria  Musculoskeletal: No joint pain, No muscle pain  Neurologic: No dizzyness, No syncope, No headaches, No numbness  Hematologic: No easy bruising, No easy bleeding  Psychiatric: No anxiety, No depression      Physical Findings   Pulse 99   Temp 97.3 °F (36.3 °C) (Temporal)   LMP 10/02/2023     Constitutional: Well nourished, well kempt  Eyes: EOMI, no scleral icterus  ENT: Nares moist, oropharynx clear  Neck: Supple, no tracheal deviation   Cardiac: Normal rate, no JVD  Respiratory: No respiratory distress, No audible wheezing  Abdominal: Soft, Nontender, Nondistended  Muskuloskeletal: Normal gait, Full ROM in all extremities  Lymphatic: no cervical or supraclavicular adenopathy  Skin: No rashes,  No lesions  Neurologic: No focal deficits  Psych: Appropriate mood and affect, oriented x3            Assessment   1. Encounter for screening colonoscopy          Plan   The patient is 44 year old and has not had a  colonoscopy. The patient's family history is negative. The patient does not have gastrointestinal symptoms.     Recommend proceeding with colonoscopy.    The benefits of colonoscopy, including detection of cancer and polyp removal prior to progression to cancer were discussed. The details of the procedure which include navigation of the colonoscope through the anus, rectum, and colon to evaluate the mucosa and removal of any polyps encountered were discussed as well. The risks of colonoscopy were discussed with the patient and include but are not limited to post-procedure bleeding, infection, colorectal perforation, splenic injury, missed polyps, need for additional surgeries or interventions. All questions were answered and the patient voiced understanding and agreed to proceed with colonoscopy.            Charo Keith MD

## 2024-02-09 ENCOUNTER — MED REC SCAN ONLY (OUTPATIENT)
Dept: FAMILY MEDICINE CLINIC | Facility: CLINIC | Age: 44
End: 2024-02-09

## 2024-02-27 ENCOUNTER — OFFICE VISIT (OUTPATIENT)
Dept: FAMILY MEDICINE CLINIC | Facility: CLINIC | Age: 44
End: 2024-02-27
Payer: COMMERCIAL

## 2024-02-27 VITALS
BODY MASS INDEX: 39.65 KG/M2 | TEMPERATURE: 98 F | DIASTOLIC BLOOD PRESSURE: 90 MMHG | WEIGHT: 238 LBS | RESPIRATION RATE: 16 BRPM | OXYGEN SATURATION: 98 % | HEIGHT: 65 IN | SYSTOLIC BLOOD PRESSURE: 130 MMHG | HEART RATE: 96 BPM

## 2024-02-27 DIAGNOSIS — J06.9 VIRAL URI: ICD-10-CM

## 2024-02-27 DIAGNOSIS — J45.31 MILD PERSISTENT ASTHMA WITH EXACERBATION (HCC): Primary | ICD-10-CM

## 2024-02-27 PROCEDURE — 3075F SYST BP GE 130 - 139MM HG: CPT | Performed by: NURSE PRACTITIONER

## 2024-02-27 PROCEDURE — 99213 OFFICE O/P EST LOW 20 MIN: CPT | Performed by: NURSE PRACTITIONER

## 2024-02-27 PROCEDURE — 3008F BODY MASS INDEX DOCD: CPT | Performed by: NURSE PRACTITIONER

## 2024-02-27 PROCEDURE — 3080F DIAST BP >= 90 MM HG: CPT | Performed by: NURSE PRACTITIONER

## 2024-02-27 RX ORDER — PREDNISONE 20 MG/1
20 TABLET ORAL DAILY
Qty: 5 TABLET | Refills: 0 | Status: SHIPPED | OUTPATIENT
Start: 2024-02-27 | End: 2024-03-03

## 2024-02-27 NOTE — PROGRESS NOTES
HPI:   Joaquina Monique is a 44 year old female who presents with ill symptoms for  2  days. Patient reports sore throat, congestion,  mild headache, chest congestion. History of asthma. Has tried Benadryl and  tylenol with mild relief. No known contacts sick. Concerned for allergies vs possible pneumonia    Current Outpatient Medications   Medication Sig Dispense Refill    LEVOCETIRIZINE 5 MG Oral Tab TAKE 1 TABLET BY MOUTH EVERY DAY IN THE EVENING 90 tablet 3    lisinopril-hydroCHLOROthiazide 10-12.5 MG Oral Tab Take 1 tablet by mouth daily. 90 tablet 3    albuterol 108 (90 Base) MCG/ACT Inhalation Aero Soln Inhale 2 puffs into the lungs every 4 (four) hours as needed for Wheezing or Shortness of Breath. 8 g 2    rosuvastatin 5 MG Oral Tab Take 1 tablet (5 mg total) by mouth nightly. 90 tablet 3    Emollient (COLLAGEN EX) Apply topically.      diphenhydrAMINE HCl (BENADRYL ALLERGY OR) Take by mouth.      Multiple Vitamin (MULTI-VITAMIN DAILY OR) Take  by mouth.       No current facility-administered medications for this visit.      Past Medical History:   Diagnosis Date    Allergic rhinitis     Asthma (HCC)     COVID     Oct 4    Esophageal reflux     Essential hypertension     Hyperlipidemia     Obesity, unspecified     Pneumonia, organism unspecified(486)       Past Surgical History:   Procedure Laterality Date    OTHER SURGICAL HISTORY      wisdom teeth    TONSILLECTOMY        Family History   Problem Relation Age of Onset    Hypertension Mother     Other (thyroid cancer) Mother 50        cancer free at 65. Quit smoking around .    Hypertension Father     Heart Disorder Father 50        MI, CABG    Diabetes Father     Other (cva) Maternal Grandmother     Other (lung cancer) Paternal Grandfather 62         at 62 from cancer. Smoker.     Breast Cancer Maternal Great-Grandmother 80        dx age 80      Social History     Socioeconomic History    Marital status: Single   Tobacco Use    Smoking  Patient in office to follow up with left foot pain and discuss surgery. CC:    Follow-up bunion pain and to discuss surgery left foot    HPI:   Presents today follow-up bunion pain left foot and to discuss surgery. Would like to proceed with surgery for surgical correction of bunion left great toe. No recent injury or trauma. She does state pain has been worse especially wearing regular close toed shoes and the more she has been on her feet. Denies calf pain. No previous history of DVT blood clots. No additional pedal complaints today. ROS:  Const: Denies constitutional symptoms  Musculo: Denies symptoms other than stated above  Skin: Denies symptoms other than stated above       Current Outpatient Medications:     progesterone (PROMETRIUM) 100 MG CAPS capsule, Take 1 capsule by mouth nightly Day 5-25 of cycle, Disp: 30 capsule, Rfl: 5  Allergies   Allergen Reactions    Aspirin        Past Medical History:   Diagnosis Date    Endometrial polyp            There were no vitals filed for this visit. Work History/Social History: Foot and ankle history:     Focused Lower Extremity Physical Exam:    Neurovascular examination:    Dorsalis Pedis palpable bilateral.  Posterior tibialis palpable bilateral.    Capillary Refill Time:  Immediate return  Hair growth:  Symmetrical and bilateral   Skin:  Not atrophic  Edema: No edema bilateral feet or ankles. Neurologic:  Light touch intact bilateral.      Musculoskeletal/ Orthopedic examination:    Equinis: Absent bilateral  Dorsiflexion, plantarflexion, inversion, eversion bilateral 5 out of 5 muscle strength  Wiggling toes  Negative Homans there is mild tenderness medial gutter left ankle. Prominent bunion deformity left great toe. There is tenderness overlying first tarsometatarsal joint. Mild tenderness medial eminence left great toe. Instability noted first tarsometatarsal joint. Dermatology examination:    No erythema.   No open skin lesions status: Former     Packs/day: 0.50     Years: 13.00     Additional pack years: 0.00     Total pack years: 6.50     Types: Cigarettes     Quit date: 2010     Years since quittin.3    Smokeless tobacco: Never   Vaping Use    Vaping Use: Never used   Substance and Sexual Activity    Alcohol use: Yes     Alcohol/week: 2.0 standard drinks of alcohol     Types: 2 Glasses of wine per week     Comment: occasionally    Drug use: No    Sexual activity: Not Currently     Partners: Male     Birth control/protection: Abstinence   Other Topics Concern    Caffeine Concern No    Exercise No    Seat Belt No    Special Diet No    Weight Concern No         REVIEW OF SYSTEMS:   GENERAL: feels well otherwise, not fatigued  HEENT: congested, as above in HPI  LUNGS: denies shortness of breath with exertion, notes some wheezing, has not used albuterol inhaler at all since start of illness  CARDIOVASCULAR: denies chest pain on exertion  GI: no nausea or abdominal pain, appetite normal  NEURO: admits to mild headaches    EXAM:   /90   Pulse 96   Temp 98.1 °F (36.7 °C) (Oral)   Resp 16   Ht 5' 5\" (1.651 m)   Wt 238 lb (108 kg)   LMP 2023 (Approximate)   SpO2 98%   BMI 39.61 kg/m²   GENERAL: well developed, well nourished,in no apparent distress, appears congested but non toxic appearing  HEENT: atraumatic, normocephalic,ears with copious wax but partial view of bilateral TMs normal, nares with mild rhinorrhea, throat normal appearing. Uvuvla midline.    NECK: supple,no adenopathy  LUNGS: all lobes with diffuse wheezing noted, not diminished air entry, breathing is non labored  CARDIO: RRR without murmur      ASSESSMENT AND PLAN:    PLAN:Joaquina was seen today for cough.    Diagnoses and all orders for this visit:    Sore throat    Mild persistent asthma with exacerbation (HCC)  -     predniSONE 20 MG Oral Tab; Take 1 tablet (20 mg total) by mouth daily for 5 days.      Prednisone 20 mg (notes 40 mg dose increased  jumpiness) daily for five days. Albuterol inhaler use discussed. Self care discussed. Medication use and risk/benefit discussed. Patient is advised to follow up with PCP if not improving with treatment plan or seek immediate care if symptoms worsen.  The patient indicates understanding of these issues and agrees to the plan.  Patient Instructions   Self care for viral illnesses:  Start Prednisone 20 mg daily, take early in day to help prevent sleeplessness.  Start Albuterol inhaler, 1-2 puffs every 6 hours as needed for cough/wheeze.  Stop Benadryl, continue Xyzal.  Salt water gargles (1 tsp. Salt in 6 oz lukewarm water), use several times daily to help remove post nasal drainage and soothe throat.  Saline nasal spray such as Ocean or Simply Saline to nostrils 2-3 times daily to help soothe tissues and keep mucus thin.  Hydrate! (cold or hot based on comfort). Drink lots of water or other non dehydrating liquids to help with illness.   May use humidifier in bedroom at night to help with congestion. Hot steamy showers can loosen congestion and help cough.   Hand washing-use hand  or wash hands frequently, cover your cough or sneeze, do not share towels or drinks with others.  May use Tylenol or Ibuprofen over the counter for pain/comfort if not contraindicated.  No work or school until fever free for 24 hours.  Follow up in 10-14 days after illness started with primary care if symptoms not complete resolved or sooner if worsening symptoms. Seek immediate care if inability to swallow or breathe or if symptoms improve greatly then worsen again.

## 2024-02-27 NOTE — PATIENT INSTRUCTIONS
Self care for viral illnesses:  Start Prednisone 20 mg daily, take early in day to help prevent sleeplessness.  Start Albuterol inhaler, 1-2 puffs every 6 hours as needed for cough/wheeze.  Stop Benadryl, continue Xyzal.  Salt water gargles (1 tsp. Salt in 6 oz lukewarm water), use several times daily to help remove post nasal drainage and soothe throat.  Saline nasal spray such as Ocean or Simply Saline to nostrils 2-3 times daily to help soothe tissues and keep mucus thin.  Hydrate! (cold or hot based on comfort). Drink lots of water or other non dehydrating liquids to help with illness.   May use humidifier in bedroom at night to help with congestion. Hot steamy showers can loosen congestion and help cough.   Hand washing-use hand  or wash hands frequently, cover your cough or sneeze, do not share towels or drinks with others.  May use Tylenol or Ibuprofen over the counter for pain/comfort if not contraindicated.  No work or school until fever free for 24 hours.  Follow up in 10-14 days after illness started with primary care if symptoms not complete resolved or sooner if worsening symptoms. Seek immediate care if inability to swallow or breathe or if symptoms improve greatly then worsen again.

## 2024-02-29 ENCOUNTER — TELEPHONE (OUTPATIENT)
Dept: FAMILY MEDICINE CLINIC | Facility: CLINIC | Age: 44
End: 2024-02-29

## 2024-02-29 NOTE — TELEPHONE ENCOUNTER
Spoke to patient and she states that she did get prednisone from the [facility] and is on Day 3.  She states she feels it is a post nasal drip cough and is currently using Nasocort.  This cough is keeping her up at night.  She was told by the [facility] to not use Benadryl or Mucinex as she was \"to dry\".  She states the cough is sometimes dry and sometimes productive.  She is aware provider out of office today and this will be addressed tomorrow and she verbalized understanding.      Please advise.

## 2024-02-29 NOTE — TELEPHONE ENCOUNTER
Pt went to [facility] 2/27/24. Pt was not given anything for her cough and cough is still bothering her. Pt asking for RX for her cough.

## 2024-03-22 ENCOUNTER — TELEPHONE (OUTPATIENT)
Facility: LOCATION | Age: 44
End: 2024-03-22

## 2024-03-22 DIAGNOSIS — Z12.11 ENCOUNTER FOR SCREENING COLONOSCOPY: Primary | ICD-10-CM

## 2024-03-22 NOTE — TELEPHONE ENCOUNTER
Transaction ID: [id number] ID: [id number] Date: 2024-03-22  LAY FRITZ Patient  Member ID  [id number]    Date of Birth  1980-01-04    Gender  Female    Transaction Type  Outpatient Authorization    Organization  [facility]    Payer  [facility] logo     Certificate Information  Reference Number  [id number]    Status  NO ACTION REQUIRED    Message  Requested Service does not require preauthorization. We would strongly encourage you to check benefits for this service.    Member Information  Patient Name  LAY FRITZ    Patient Date of Birth  1980-01-04    Patient Gender  Female    Member ID  [id number]    Relationship to Subscriber  Self    Subscriber Name  LAY FRITZ    Requesting Provider     Name  JOHN BOURNE    NPI  [id number]    Tax Id  [id number]    Specialty  [id number]  Provider Role  Provider    Address  [address] [place] [address]    Phone  [phone]  Fax  [phone]    Contact Name  MOON AHMADI    Service Information  Service Type  2 - Surgical    Place of Service  22 - [facility]    Service From - To Date  2024-04-11 - 2024-08-12    Level of Service  Elective    Diagnosis Code 1   - Encounter for screening for malignant neoplasm of colon    Procedure Code 1 (CPT/HCPCS)  62020 - DIAGNOSTIC COLONOSCOPY    Quantity  1 Units    Status  NO ACTION REQUIRED    Rendering Provider/Facility     Provider 1  Name  JOHN BOURNE    NPI  [id number]    Specialty  [id number]  Provider Role  Attending    Address  [address] [place] [address]    Fax  [phone]    Provider 2  Name  [facility]    NPI  [id number]    Provider Role  Facility    Address  [address] [place] [address]      [phone]

## 2024-03-26 RX ORDER — CETIRIZINE HYDROCHLORIDE 10 MG/1
10 TABLET ORAL DAILY
COMMUNITY

## 2024-03-26 RX ORDER — AZELASTINE HCL 205.5 UG/1
SPRAY NASAL
COMMUNITY

## 2024-04-01 ENCOUNTER — TELEPHONE (OUTPATIENT)
Facility: LOCATION | Age: 44
End: 2024-04-01

## 2024-04-01 RX ORDER — SOD SULF/POT CHLORIDE/MAG SULF 1.479 G
TABLET ORAL
Qty: 24 TABLET | Refills: 0 | Status: SHIPPED | OUTPATIENT
Start: 2024-04-01

## 2024-04-02 RX ORDER — POLYETHYLENE GLYCOL 3350, SODIUM CHLORIDE, SODIUM BICARBONATE, POTASSIUM CHLORIDE 420; 11.2; 5.72; 1.48 G/4L; G/4L; G/4L; G/4L
POWDER, FOR SOLUTION ORAL
Qty: 1 EACH | Refills: 0 | Status: SHIPPED | OUTPATIENT
Start: 2024-04-02

## 2024-04-08 ENCOUNTER — TELEPHONE (OUTPATIENT)
Dept: FAMILY MEDICINE CLINIC | Facility: CLINIC | Age: 44
End: 2024-04-08

## 2024-04-08 NOTE — TELEPHONE ENCOUNTER
Surgery: Colonoscopy    Surgery  date: 04/11/2024    Location: [facility]    Surgeon: Dr. Keith    Pre- op date/ Provider: 04/09/2024 - Hector    Pre - Op sheet routed to Hector's mail box.

## 2024-04-11 ENCOUNTER — HOSPITAL ENCOUNTER (OUTPATIENT)
Facility: HOSPITAL | Age: 44
Setting detail: HOSPITAL OUTPATIENT SURGERY
Discharge: HOME | End: 2024-04-11
Attending: STUDENT IN AN ORGANIZED HEALTH CARE EDUCATION/TRAINING PROGRAM | Admitting: STUDENT IN AN ORGANIZED HEALTH CARE EDUCATION/TRAINING PROGRAM
Payer: COMMERCIAL

## 2024-04-11 ENCOUNTER — ANESTHESIA (OUTPATIENT)
Dept: ENDOSCOPY | Facility: HOSPITAL | Age: 44
End: 2024-04-11
Payer: COMMERCIAL

## 2024-04-11 ENCOUNTER — ANESTHESIA EVENT (OUTPATIENT)
Dept: ENDOSCOPY | Facility: HOSPITAL | Age: 44
End: 2024-04-11
Payer: COMMERCIAL

## 2024-04-11 VITALS
HEIGHT: 65.5 IN | SYSTOLIC BLOOD PRESSURE: 108 MMHG | TEMPERATURE: 97 F | RESPIRATION RATE: 22 BRPM | HEART RATE: 76 BPM | WEIGHT: 230 LBS | BODY MASS INDEX: 37.86 KG/M2 | OXYGEN SATURATION: 100 % | DIASTOLIC BLOOD PRESSURE: 67 MMHG

## 2024-04-11 DIAGNOSIS — Z12.11 ENCOUNTER FOR SCREENING COLONOSCOPY: ICD-10-CM

## 2024-04-11 LAB — B-HCG UR QL: NEGATIVE

## 2024-04-11 PROCEDURE — 0DJD8ZZ INSPECTION OF LOWER INTESTINAL TRACT, VIA NATURAL OR ARTIFICIAL OPENING ENDOSCOPIC: ICD-10-PCS | Performed by: STUDENT IN AN ORGANIZED HEALTH CARE EDUCATION/TRAINING PROGRAM

## 2024-04-11 PROCEDURE — 81025 URINE PREGNANCY TEST: CPT

## 2024-04-11 RX ORDER — SODIUM CHLORIDE, SODIUM LACTATE, POTASSIUM CHLORIDE, CALCIUM CHLORIDE 600; 310; 30; 20 MG/100ML; MG/100ML; MG/100ML; MG/100ML
INJECTION, SOLUTION INTRAVENOUS CONTINUOUS
Status: DISCONTINUED | OUTPATIENT
Start: 2024-04-11 | End: 2024-04-11

## 2024-04-11 RX ADMIN — SODIUM CHLORIDE, SODIUM LACTATE, POTASSIUM CHLORIDE, CALCIUM CHLORIDE: 600; 310; 30; 20 INJECTION, SOLUTION INTRAVENOUS at 09:48:00

## 2024-04-11 RX ADMIN — SODIUM CHLORIDE, SODIUM LACTATE, POTASSIUM CHLORIDE, CALCIUM CHLORIDE: 600; 310; 30; 20 INJECTION, SOLUTION INTRAVENOUS at 09:20:00

## 2024-04-11 NOTE — ANESTHESIA POSTPROCEDURE EVALUATION
[facility]    Joaquina Monique Patient Status:  Hospital Outpatient Surgery   Age/Gender 44 year old female MRN [id number]   Location [facility] Attending Charo Keith MD   Hosp Day # 0 PCP MEHDI HERNANDEZ MD       Anesthesia Post-op Note    COLONOSCOPY    Procedure Summary       Date: 04/11/24 Room / Location:  ENDOSCOPY 04 / [facility]    Anesthesia Start: 0919 Anesthesia Stop: 0948    Procedure: COLONOSCOPY Diagnosis:       Encounter for screening colonoscopy      (internal hemorrhoids)    Surgeons: Charo Keith MD Anesthesiologist: Preston Crump MD    Anesthesia Type: MAC ASA Status: 2            Anesthesia Type: MAC    Vitals Value Taken Time   /75 04/11/24 0948   Temp 98 °F (36.7 °C) 04/11/24 0948   Pulse 96 04/11/24 0948   Resp 20 04/11/24 0948   SpO2 99 % 04/11/24 0948       Patient Location: Endoscopy    Anesthesia Type: MAC    Airway Patency: patent    Postop Pain Control: adequate    Mental Status: mildly sedated but able to meaningfully participate in the post-anesthesia evaluation    Nausea/Vomiting: none    Cardiopulmonary/Hydration status: stable euvolemic    Complications: no apparent anesthesia related complications    Postop vital signs: stable    Dental Exam: Unchanged from Preop    Patient to be discharged home when criteria met.

## 2024-04-11 NOTE — H&P
New Patient Visit Note     Active Problems      1. Encounter for screening colonoscopy          Chief Complaint        Chief Complaint   Patient presents with    New Patient       NP - Csccope - No family history, Pt c/o occasional rectal bleeding         PCP  MEHDI HERNANDEZ MD      History of Present Illness   Joaquina Monique is a 44 year old female who presents for evaluation for colonoscopy.     The patient has not had a previous colonoscopy. The patient notes that for the past few years, she has had some rare painless rectal bleeding episodes when wiping. She characterizes it as bright red in color. These episodes have increased slightly these past 2 months occurring every couple weeks. The patient states that she notices it most after strenuous exercise like lifting weights or shoveling snow. Otherwise, she denies any changes in her bowel habits, stool caliber, and consistency. Pt also notes an occasional burning sensation in the periumbilical area and further downwards when she lays down.     The patient's family history is negative for gastric and colorectal malignancies     The patient denies nausea, vomiting, abdominal pain or cramping or bloating, diarrhea, constipation, dark tarry stools, other recent changes in bowel habits, or recent weight loss.     The patient's past medical history includes Allergic rhinitis, Asthma, Esophageal reflux, Essential HTN, HLD, and obesity.     The patient's past surgical history includes tonsillectomy 01/01/1998      The patient takes no blood thinners.        Past Medical / Surgical / Social / Family History    The past medical history, past surgical history, family history, social history, allergies, and medications have been reviewed by me today.     Medications Ordered Prior to this Encounter        Current Outpatient Medications on File Prior to Visit   Medication Sig    LEVOCETIRIZINE 5 MG Oral Tab TAKE 1 TABLET BY MOUTH EVERY DAY IN THE EVENING     lisinopril-hydroCHLOROthiazide 10-12.5 MG Oral Tab Take 1 tablet by mouth daily.    albuterol 108 (90 Base) MCG/ACT Inhalation Aero Soln Inhale 2 puffs into the lungs every 4 (four) hours as needed for Wheezing or Shortness of Breath.    rosuvastatin 5 MG Oral Tab Take 1 tablet (5 mg total) by mouth nightly.    Emollient (COLLAGEN EX) Apply topically.    diphenhydrAMINE HCl (BENADRYL ALLERGY OR) Take by mouth.    Multiple Vitamin (MULTI-VITAMIN DAILY OR) Take  by mouth.      No current facility-administered medications on file prior to visit.            Review of Systems  Constitutional: No Fever, No Chills, No Diaphoresis, No fatigue, No weight loss, No anorexia  Eyes: No burry vision, No icterus  ENT: No tinnitus, No rhinorrhea, No dysphagia, No odynophagia  Respiratory: No dyspnea, No wheezing  Cardiovascular: No chest pain, No palpitations, No leg swelling  Gastrointestinal: No abdominal pain, No abdominal distention, No bloating, No nausea, No vomiting, No diarrhea, No constipation, No melena, No hematochezia  Endocrine: No polydipsia, No polyuria  Genitoruinary: No dysuria, No Hematuria  Musculoskeletal: No joint pain, No muscle pain  Neurologic: No dizzyness, No syncope, No headaches, No numbness  Hematologic: No easy bruising, No easy bleeding  Psychiatric: No anxiety, No depression        Physical Findings    Constitutional: Well nourished, well kempt  Eyes: EOMI, no scleral icterus  ENT: Nares moist, oropharynx clear  Neck: Supple, no tracheal deviation   Cardiac: Normal rate, no JVD  Respiratory: No respiratory distress, No audible wheezing  Abdominal: Soft, Nontender, Nondistended  Muskuloskeletal: Normal gait, Full ROM in all extremities  Lymphatic: no cervical or supraclavicular adenopathy  Skin: No rashes,  No lesions  Neurologic: No focal deficits  Psych: Appropriate mood and affect, oriented x3           Assessment   1. Encounter for screening colonoscopy             Plan   The patient is 44 year  old and has not had a colonoscopy. The patient's family history is negative. The patient does not have gastrointestinal symptoms.      Recommend proceeding with colonoscopy today.     The benefits of colonoscopy, including detection of cancer and polyp removal prior to progression to cancer were discussed. The details of the procedure which include navigation of the colonoscope through the anus, rectum, and colon to evaluate the mucosa and removal of any polyps encountered were discussed as well. The risks of colonoscopy were discussed with the patient and include but are not limited to post-procedure bleeding, infection, colorectal perforation, splenic injury, missed polyps, need for additional surgeries or interventions. All questions were answered and the patient voiced understanding and agreed to proceed with colonoscopy.

## 2024-04-11 NOTE — DISCHARGE INSTRUCTIONS
Home Care Instructions for Colonoscopy with Sedation    Diet:  - Resume your regular diet as tolerated unless otherwise instructed.  - Start with light meals to minimize bloating.  - Do not drink alcohol today.    Medication:  - If you have questions about resuming your normal medications, please contact your Primary Care Physician.    Activities:  - Take it easy today. Do not return to work today.  - Do not drive today.  - Do not operate any machinery today (including kitchen equipment).    Colonoscopy:  - You may notice some rectal \"spotting\" (a little blood on the toilet tissue) for a day or two after the exam. This is normal.  - If you experience any rectal bleeding (not spotting), persistent tenderness or sharp severe abdominal pains, oral temperature over 100 degrees Fahrenheit, light-headedness or dizziness, or any other problems, contact your doctor.        **If unable to reach your doctor, please go to the [facility] Emergency Room**    - Your referring physician will receive a full report of your examination.  - If you do not hear from your doctor's office within two weeks of your biopsy, please call them for your results.    You may be able to see your laboratory results in [employer] between 4 and 7 business days.  In some cases, your physician may not have viewed the results before they are released to [employer].  If you have questions regarding your results contact the physician who ordered the test/exam by phone or via [employer] by choosing \"Ask a Medical Question.\"

## 2024-04-11 NOTE — ANESTHESIA PREPROCEDURE EVALUATION
PRE-OP EVALUATION    Patient Name: Joaquina Monique    Admit Diagnosis: Encounter for screening colonoscopy [Z12.11]    Pre-op Diagnosis: Encounter for screening colonoscopy [Z12.11]    COLONOSCOPY    Anesthesia Procedure: COLONOSCOPY    Surgeons and Role:     * Charo Keith MD - Primary    Pre-op vitals reviewed.  Temp: 96.9 °F (36.1 °C)  Pulse: 101  Resp: 18  BP: 133/78  SpO2: 98 %  Body mass index is 37.69 kg/m².    Current medications reviewed.  Hospital Medications:   lactated ringers infusion   Intravenous Continuous       Outpatient Medications:     Medications Prior to Admission   Medication Sig Dispense Refill Last Dose    PEG 3350-KCl-Na Bicarb-NaCl (TRILYTE) 420 g Oral Recon Soln Starting at 4:00 pm the night before procedure, drink 8 ounces of the prep every 15-20 minutes until finished 1 each 0 4/10/2024    ELDERBERRY OR Take by mouth.       cetirizine 10 MG Oral Tab Take 1 tablet (10 mg total) by mouth daily.   4/10/2024    Azelastine HCl 0.15 % Nasal Solution by Nasal route.       lisinopril-hydroCHLOROthiazide 10-12.5 MG Oral Tab Take 1 tablet by mouth daily. 90 tablet 3 4/11/2024    rosuvastatin 5 MG Oral Tab Take 1 tablet (5 mg total) by mouth nightly. 90 tablet 3 4/10/2024    Emollient (COLLAGEN EX) Apply topically.       diphenhydrAMINE HCl (BENADRYL ALLERGY OR) Take by mouth.       Multiple Vitamin (MULTI-VITAMIN DAILY OR) Take  by mouth.       Sodium Sulfate-Mag Sulfate-KCl (SUTAB) 2875-426-068 MG Oral Tab Starting at 4PM the night before your procedure open one bottle and take all 12 tablets with 16 ounces of water within 15-20 minutes. At 9PM open the second bottle and take all 12 tablets with another 16 ounces of water. 24 tablet 0     LEVOCETIRIZINE 5 MG Oral Tab TAKE 1 TABLET BY MOUTH EVERY DAY IN THE EVENING (Patient not taking: Reported on 3/26/2024) 90 tablet 3 Not Taking    albuterol 108 (90 Base) MCG/ACT Inhalation Aero Soln Inhale 2 puffs into the lungs every 4 (four) hours as  needed for Wheezing or Shortness of Breath. 8 g 2 Unknown       Allergies: Bactrim ds [sulfamethoxazole w/trimethoprim] and Polysporin [bacitracin-polymyxin b]      Anesthesia Evaluation    Patient summary reviewed.    Anesthetic Complications  (-) history of anesthetic complications         GI/Hepatic/Renal      (+) GERD                           Cardiovascular        Exercise tolerance: good     MET: >4      (+) hypertension                                     Endo/Other    Negative endo/other ROS.                              Pulmonary      (+) asthma                     Neuro/Psych                                      Past Surgical History:   Procedure Laterality Date    Other surgical history      wisdom teeth    Tonsillectomy       Social History     Socioeconomic History    Marital status: Single   Tobacco Use    Smoking status: Former     Current packs/day: 0.00     Average packs/day: 0.5 packs/day for 13.0 years (6.5 ttl pk-yrs)     Types: Cigarettes     Start date: 1997     Quit date: 2010     Years since quittin.4    Smokeless tobacco: Never   Vaping Use    Vaping status: Never Used   Substance and Sexual Activity    Alcohol use: Yes     Alcohol/week: 2.0 standard drinks of alcohol     Types: 2 Glasses of wine per week     Comment: occasionally    Drug use: No    Sexual activity: Not Currently     Partners: Male     Birth control/protection: Abstinence   Other Topics Concern    Caffeine Concern No    Exercise No    Seat Belt No    Special Diet No    Weight Concern No     History   Drug Use No     Available pre-op labs reviewed.               Airway      Mallampati: II  Mouth opening: >3 FB  TM distance: 4 - 6 cm  Neck ROM: full Cardiovascular    Cardiovascular exam normal.         Dental             Pulmonary    Pulmonary exam normal.                 Other findings              ASA: 2   Plan: MAC  NPO status verified and patient meets guidelines.          Plan/risks discussed with:  patient                Present on Admission:  **None**

## 2024-04-11 NOTE — OPERATIVE REPORT
[facility]  Operative Note    Joaquina Monique Location: OR   CSN [id number] MRN [id number]    1980 Age 44 year old   Admission Date 2024 Operation Date 2024   Attending Physician Charo Keith MD Operating Physician Charo Keith MD   PCP MEHDI HERNANDEZ MD          Patient Name: Joaquina Monique    Preoperative Diagnosis: Encounter for screening colonoscopy [Z12.11]    Postoperative Diagnosis: Same as pre-op diagnosis.    Primary Surgeon: Charo Keith MD    Anesthesia: MAC    Anesthesiologist: Anesthesiologist.: Preston Crump MD    Procedures: Colonoscopy to the cecum     Specimen:     Estimated Blood Loss: None    Complications: None immediate    Condition: Good    Indications for Surgery:   Joaquina Monique is a 44 year old female here for screening colonoscopy. The benefits of colonoscopy, including detection of cancer and polyp removal prior to progression to cancer were discussed. The details of the procedure which include navigation of the colonoscope through the anus, rectum, and colon to evaluate the mucosa and removal of any polyps encountered were discussed as well. The risks of colonoscopy were discussed with the patient and include but are not limited to post-procedure bleeding, infection, colorectal perforation, splenic injury, missed polyps, need for additional surgeries or interventions. All questions were answered and the patient voiced understanding and agreed to proceed with colonoscopy.      Surgical Findings:   The quality of the bowel prep was excellent. 3,3,3    Description of Procedure:   The Patient was taken to the endoscopy suite and positioned in the left lateral decubitus position with knees flexed. Monitored anesthesia care was administered. A time-out was performed.    The perineum and perianal skin were examined. A digital rectal examination was performed. Normal exam noted, no masses.. A well-lubricated adult colonoscope was then inserted and carefully  navigated to the cecum. Advancement was accomplished without difficulty. The appendiceal orifice and ileocecal valve were identified and photographed. The terminal ileum was not intubated. The scope was then withdrawn as the mucosa was circumferentially examined. Normal findings throughout the colon and rectum. In the rectum the scope was retroflexed to evaluate the anorectal junction.  There were small sized internal hemorrhoids noted. The scope was straightened and excess gas was suctioned from the colon and the scope removed, terminating the procedure.    Anesthesia was terminated and the patient transported to the recovery unit in good condition. The patient tolerated the procedure well without apparent intraoperative complication.    Follow up:   Patient will need next colonoscopy 10 years.       Charo Keith MD  4/11/2024  9:50 AM

## 2024-06-28 ENCOUNTER — OFFICE VISIT (OUTPATIENT)
Dept: FAMILY MEDICINE CLINIC | Facility: CLINIC | Age: 44
End: 2024-06-28

## 2024-06-28 VITALS
TEMPERATURE: 98 F | DIASTOLIC BLOOD PRESSURE: 80 MMHG | WEIGHT: 230 LBS | HEART RATE: 120 BPM | BODY MASS INDEX: 37.86 KG/M2 | SYSTOLIC BLOOD PRESSURE: 138 MMHG | HEIGHT: 65.5 IN | RESPIRATION RATE: 20 BRPM | OXYGEN SATURATION: 98 %

## 2024-06-28 DIAGNOSIS — R39.9 UTI SYMPTOMS: Primary | ICD-10-CM

## 2024-06-28 LAB
APPEARANCE: CLEAR
BILIRUBIN: NEGATIVE
GLUCOSE (URINE DIPSTICK): NEGATIVE MG/DL
KETONES (URINE DIPSTICK): NEGATIVE MG/DL
LEUKOCYTES: NEGATIVE
MULTISTIX LOT#: NORMAL NUMERIC
NITRITE, URINE: NEGATIVE
OCCULT BLOOD: NEGATIVE
PH, URINE: 6 (ref 4.5–8)
PROTEIN (URINE DIPSTICK): NEGATIVE MG/DL
SPECIFIC GRAVITY: <=1.005 (ref 1–1.03)
URINE-COLOR: YELLOW
UROBILINOGEN,SEMI-QN: 0.2 MG/DL (ref 0–1.9)

## 2024-06-28 PROCEDURE — 3075F SYST BP GE 130 - 139MM HG: CPT | Performed by: NURSE PRACTITIONER

## 2024-06-28 PROCEDURE — 3008F BODY MASS INDEX DOCD: CPT | Performed by: NURSE PRACTITIONER

## 2024-06-28 PROCEDURE — 99213 OFFICE O/P EST LOW 20 MIN: CPT | Performed by: NURSE PRACTITIONER

## 2024-06-28 PROCEDURE — 81003 URINALYSIS AUTO W/O SCOPE: CPT | Performed by: NURSE PRACTITIONER

## 2024-06-28 PROCEDURE — 3079F DIAST BP 80-89 MM HG: CPT | Performed by: NURSE PRACTITIONER

## 2024-06-28 PROCEDURE — 87086 URINE CULTURE/COLONY COUNT: CPT | Performed by: NURSE PRACTITIONER

## 2024-06-28 RX ORDER — NITROFURANTOIN 25; 75 MG/1; MG/1
100 CAPSULE ORAL 2 TIMES DAILY
Qty: 14 CAPSULE | Refills: 0 | Status: SHIPPED | OUTPATIENT
Start: 2024-06-28 | End: 2024-07-05

## 2024-06-28 NOTE — PROGRESS NOTES
CHIEF COMPLAINT:     Chief Complaint   Patient presents with    Urinary Symptoms     Believe i have a UTI - Entered by patient  Urination freq with mild lower abd pain for 3 days.  Worst at bedtime.         HPI:   Joaquina Monique is a 44 year old female who presents with symptoms of UTI. Patient reporting symptoms of dysuria, frequency for 3 days.  Symptoms have been consistent since onset.  Treatments tried: none.  Associated symptoms: none.  Patient denies flank pain, fever, hematuria, nausea, or vomiting. Patient denies genital discharge or itching. Patient denies new sexual partners in the last 3 months. Patient denies recent unprotected sexual intercourse.     Current Outpatient Medications   Medication Sig Dispense Refill    nitrofurantoin monohydrate macro 100 MG Oral Cap Take 1 capsule (100 mg total) by mouth 2 (two) times daily for 7 days. 14 capsule 0    PEG 3350-KCl-Na Bicarb-NaCl (TRILYTE) 420 g Oral Recon Soln Starting at 4:00 pm the night before procedure, drink 8 ounces of the prep every 15-20 minutes until finished 1 each 0    Sodium Sulfate-Mag Sulfate-KCl (SUTAB) 5600-885-874 MG Oral Tab Starting at 4PM the night before your procedure open one bottle and take all 12 tablets with 16 ounces of water within 15-20 minutes. At 9PM open the second bottle and take all 12 tablets with another 16 ounces of water. 24 tablet 0    ELDERBERRY OR Take by mouth.      cetirizine 10 MG Oral Tab Take 1 tablet (10 mg total) by mouth daily.      Azelastine HCl 0.15 % Nasal Solution by Nasal route.      LEVOCETIRIZINE 5 MG Oral Tab TAKE 1 TABLET BY MOUTH EVERY DAY IN THE EVENING (Patient not taking: Reported on 3/26/2024) 90 tablet 3    lisinopril-hydroCHLOROthiazide 10-12.5 MG Oral Tab Take 1 tablet by mouth daily. 90 tablet 3    albuterol 108 (90 Base) MCG/ACT Inhalation Aero Soln Inhale 2 puffs into the lungs every 4 (four) hours as needed for Wheezing or Shortness of Breath. 8 g 2    rosuvastatin 5 MG Oral Tab  Take 1 tablet (5 mg total) by mouth nightly. 90 tablet 3    Emollient (COLLAGEN EX) Apply topically.      diphenhydrAMINE HCl (BENADRYL ALLERGY OR) Take by mouth.      Multiple Vitamin (MULTI-VITAMIN DAILY OR) Take  by mouth.        Past Medical History:    Allergic rhinitis    Asthma (HCC)    COVID    Oct 4    Esophageal reflux    Essential hypertension    Hiatal hernia    High blood pressure    High cholesterol    Hyperlipidemia    Obesity, unspecified    Pneumonia, organism unspecified(486)    Visual impairment    GLASSES      Social History:  Social History     Socioeconomic History    Marital status: Single   Tobacco Use    Smoking status: Former     Current packs/day: 0.00     Average packs/day: 0.5 packs/day for 13.0 years (6.5 ttl pk-yrs)     Types: Cigarettes     Start date: 1997     Quit date: 2010     Years since quittin.6    Smokeless tobacco: Never   Vaping Use    Vaping status: Never Used   Substance and Sexual Activity    Alcohol use: Yes     Alcohol/week: 2.0 standard drinks of alcohol     Types: 2 Glasses of wine per week     Comment: occasionally    Drug use: No    Sexual activity: Not Currently     Partners: Male     Birth control/protection: Abstinence   Other Topics Concern    Caffeine Concern No    Exercise No    Seat Belt No    Special Diet No    Weight Concern No         REVIEW OF SYSTEMS:   GENERAL: See above  GI: See HPI.    : See HPI.      EXAM:   /80   Pulse 120   Temp 98.4 °F (36.9 °C) (Oral)   Resp 20   Ht 5' 5.5\" (1.664 m)   Wt 230 lb (104.3 kg)   LMP 2024 (Within Days)   SpO2 98%   BMI 37.69 kg/m²   GENERAL: well developed, well nourished,in no apparent distress  CARDIO: RRR, no murmurs  LUNGS: clear to ausculation bilaterally, no wheezing or rhonchi  GI: BS present x 4.  No hepatosplenomegaly.  : mild suprapubic tenderness. No bladder distention, or CVAT     Recent Results (from the past 24 hour(s))   Urine Dip, auto without Micro    Collection  Time: 06/28/24 11:33 AM   Result Value Ref Range    Glucose Urine Negative Negative mg/dL    Bilirubin Urine Negative Negative    Ketones, UA Negative Negative - Trace mg/dL    Spec Gravity <=1.005 1.005 - 1.030    Blood Urine Negative Negative    PH Urine 6.0 5.0 - 8.0    Protein Urine Negative Negative - Trace mg/dL    Urobilinogen Urine 0.2 0.2 - 1.0 mg/dL    Nitrite Urine Negative Negative    Leukocyte Esterase Urine Negative Negative    APPEARANCE Clear Clear    Color Urine Yellow Yellow    Multistix Lot# [id number] Numeric    Multistix Expiration Date 4/30//2025 Date         ASSESSMENT AND PLAN:   Joaquina Monique is a 44 year old female presents with urinary symptoms.    ASSESSMENT:  Encounter Diagnosis   Name Primary?    UTI symptoms Yes       PLAN: Urine dip negative, but will treat empirically.  Meds as listed below.  Comfort measures as described in Patient Instructions. will send urine culture.     Meds & Refills for this Visit:  Requested Prescriptions     Signed Prescriptions Disp Refills    nitrofurantoin monohydrate macro 100 MG Oral Cap 14 capsule 0     Sig: Take 1 capsule (100 mg total) by mouth 2 (two) times daily for 7 days.       Risk and benefits of medication discussed.   Stressed importance of completing full course of antibiotic unless told otherwise.     The patient indicates understanding of these issues and agrees to the plan.  The patient is asked to see PCP in 3 days if not better. Seek care immediately for new onset of fever, vomiting, worsening symptoms.    There are no Patient Instructions on file for this visit.

## 2024-10-02 ENCOUNTER — TELEPHONE (OUTPATIENT)
Dept: FAMILY MEDICINE CLINIC | Facility: CLINIC | Age: 44
End: 2024-10-02

## 2024-10-02 DIAGNOSIS — Z00.00 WELL WOMAN EXAM (NO GYNECOLOGICAL EXAM): Primary | ICD-10-CM

## 2024-10-04 ENCOUNTER — LAB ENCOUNTER (OUTPATIENT)
Dept: LAB | Age: 44
End: 2024-10-04
Attending: FAMILY MEDICINE
Payer: COMMERCIAL

## 2024-10-04 DIAGNOSIS — Z00.00 WELL WOMAN EXAM (NO GYNECOLOGICAL EXAM): ICD-10-CM

## 2024-10-04 LAB
ALBUMIN SERPL-MCNC: 4.6 G/DL (ref 3.2–4.8)
ALBUMIN/GLOB SERPL: 1.7 {RATIO} (ref 1–2)
ALP LIVER SERPL-CCNC: 74 U/L
ALT SERPL-CCNC: 16 U/L
ANION GAP SERPL CALC-SCNC: 7 MMOL/L (ref 0–18)
AST SERPL-CCNC: 18 U/L (ref ?–34)
BASOPHILS # BLD AUTO: 0.05 X10(3) UL (ref 0–0.2)
BASOPHILS NFR BLD AUTO: 0.6 %
BILIRUB SERPL-MCNC: 0.9 MG/DL (ref 0.3–1.2)
BUN BLD-MCNC: 9 MG/DL (ref 9–23)
CALCIUM BLD-MCNC: 10.3 MG/DL (ref 8.7–10.4)
CHLORIDE SERPL-SCNC: 103 MMOL/L (ref 98–112)
CHOLEST SERPL-MCNC: 161 MG/DL (ref ?–200)
CO2 SERPL-SCNC: 27 MMOL/L (ref 21–32)
CREAT BLD-MCNC: 0.77 MG/DL
EGFRCR SERPLBLD CKD-EPI 2021: 97 ML/MIN/1.73M2 (ref 60–?)
EOSINOPHIL # BLD AUTO: 0.16 X10(3) UL (ref 0–0.7)
EOSINOPHIL NFR BLD AUTO: 1.8 %
ERYTHROCYTE [DISTWIDTH] IN BLOOD BY AUTOMATED COUNT: 12.7 %
FASTING PATIENT LIPID ANSWER: YES
FASTING STATUS PATIENT QL REPORTED: YES
GLOBULIN PLAS-MCNC: 2.7 G/DL (ref 2–3.5)
GLUCOSE BLD-MCNC: 106 MG/DL (ref 70–99)
HCT VFR BLD AUTO: 44.6 %
HDLC SERPL-MCNC: 50 MG/DL (ref 40–59)
HGB BLD-MCNC: 14.5 G/DL
IMM GRANULOCYTES # BLD AUTO: 0.04 X10(3) UL (ref 0–1)
IMM GRANULOCYTES NFR BLD: 0.4 %
LDLC SERPL CALC-MCNC: 81 MG/DL (ref ?–100)
LYMPHOCYTES # BLD AUTO: 2.24 X10(3) UL (ref 1–4)
LYMPHOCYTES NFR BLD AUTO: 25 %
MCH RBC QN AUTO: 29.8 PG (ref 26–34)
MCHC RBC AUTO-ENTMCNC: 32.5 G/DL (ref 31–37)
MCV RBC AUTO: 91.8 FL
MONOCYTES # BLD AUTO: 0.57 X10(3) UL (ref 0.1–1)
MONOCYTES NFR BLD AUTO: 6.4 %
NEUTROPHILS # BLD AUTO: 5.9 X10 (3) UL (ref 1.5–7.7)
NEUTROPHILS # BLD AUTO: 5.9 X10(3) UL (ref 1.5–7.7)
NEUTROPHILS NFR BLD AUTO: 65.8 %
NONHDLC SERPL-MCNC: 111 MG/DL (ref ?–130)
OSMOLALITY SERPL CALC.SUM OF ELEC: 283 MOSM/KG (ref 275–295)
PLATELET # BLD AUTO: 367 10(3)UL (ref 150–450)
POTASSIUM SERPL-SCNC: 4.5 MMOL/L (ref 3.5–5.1)
PROT SERPL-MCNC: 7.3 G/DL (ref 5.7–8.2)
RBC # BLD AUTO: 4.86 X10(6)UL
SODIUM SERPL-SCNC: 137 MMOL/L (ref 136–145)
TRIGL SERPL-MCNC: 175 MG/DL (ref 30–149)
TSI SER-ACNC: 1.42 MIU/ML (ref 0.55–4.78)
VLDLC SERPL CALC-MCNC: 28 MG/DL (ref 0–30)
WBC # BLD AUTO: 9 X10(3) UL (ref 4–11)

## 2024-10-04 PROCEDURE — 85025 COMPLETE CBC W/AUTO DIFF WBC: CPT

## 2024-10-04 PROCEDURE — 36415 COLL VENOUS BLD VENIPUNCTURE: CPT

## 2024-10-04 PROCEDURE — 84443 ASSAY THYROID STIM HORMONE: CPT

## 2024-10-04 PROCEDURE — 80053 COMPREHEN METABOLIC PANEL: CPT

## 2024-10-04 PROCEDURE — 80061 LIPID PANEL: CPT

## 2024-10-07 RX ORDER — LISINOPRIL/HYDROCHLOROTHIAZIDE 10-12.5 MG
1 TABLET ORAL DAILY
Qty: 90 TABLET | Refills: 0 | Status: SHIPPED | OUTPATIENT
Start: 2024-10-07

## 2024-10-07 RX ORDER — ROSUVASTATIN CALCIUM 5 MG/1
5 TABLET, COATED ORAL NIGHTLY
Qty: 90 TABLET | Refills: 0 | Status: SHIPPED | OUTPATIENT
Start: 2024-10-07

## 2024-10-08 ENCOUNTER — OFFICE VISIT (OUTPATIENT)
Dept: FAMILY MEDICINE CLINIC | Facility: CLINIC | Age: 44
End: 2024-10-08
Payer: COMMERCIAL

## 2024-10-08 VITALS
SYSTOLIC BLOOD PRESSURE: 124 MMHG | HEIGHT: 65 IN | DIASTOLIC BLOOD PRESSURE: 82 MMHG | WEIGHT: 233 LBS | OXYGEN SATURATION: 98 % | HEART RATE: 78 BPM | BODY MASS INDEX: 38.82 KG/M2

## 2024-10-08 DIAGNOSIS — E66.01 CLASS 2 SEVERE OBESITY DUE TO EXCESS CALORIES WITH SERIOUS COMORBIDITY AND BODY MASS INDEX (BMI) OF 38.0 TO 38.9 IN ADULT (HCC): ICD-10-CM

## 2024-10-08 DIAGNOSIS — J45.30 MILD PERSISTENT ASTHMA WITHOUT COMPLICATION (HCC): ICD-10-CM

## 2024-10-08 DIAGNOSIS — Z00.00 ROUTINE PHYSICAL EXAMINATION: Primary | ICD-10-CM

## 2024-10-08 DIAGNOSIS — I10 ESSENTIAL HYPERTENSION: ICD-10-CM

## 2024-10-08 DIAGNOSIS — E78.2 MIXED HYPERLIPIDEMIA: ICD-10-CM

## 2024-10-08 DIAGNOSIS — E66.812 CLASS 2 SEVERE OBESITY DUE TO EXCESS CALORIES WITH SERIOUS COMORBIDITY AND BODY MASS INDEX (BMI) OF 38.0 TO 38.9 IN ADULT (HCC): ICD-10-CM

## 2024-10-08 DIAGNOSIS — Z23 NEED FOR VACCINATION: ICD-10-CM

## 2024-10-08 PROCEDURE — 90471 IMMUNIZATION ADMIN: CPT | Performed by: FAMILY MEDICINE

## 2024-10-08 PROCEDURE — 90656 IIV3 VACC NO PRSV 0.5 ML IM: CPT | Performed by: FAMILY MEDICINE

## 2024-10-08 PROCEDURE — 3079F DIAST BP 80-89 MM HG: CPT | Performed by: FAMILY MEDICINE

## 2024-10-08 PROCEDURE — 99396 PREV VISIT EST AGE 40-64: CPT | Performed by: FAMILY MEDICINE

## 2024-10-08 PROCEDURE — 3074F SYST BP LT 130 MM HG: CPT | Performed by: FAMILY MEDICINE

## 2024-10-08 PROCEDURE — 3008F BODY MASS INDEX DOCD: CPT | Performed by: FAMILY MEDICINE

## 2024-10-08 NOTE — PATIENT INSTRUCTIONS
For weight loss consider NOOM - looks at psychological factors of eating/food choices     [facility] [place] [facility] run program     If not improving in 3-6 months - let's talk about medications   -phentermine  -Contrave  -Wegovy  -Zepbound      Ear Perfecto CAMPUZANO (audiologist approved)   Debrox   Or soapy finger/shower

## 2024-10-08 NOTE — PROGRESS NOTES
Joaquina Monique is a 44 year old female.    CC:    Chief Complaint   Patient presents with    Physical     Physical and blood work review   Would like flu shot today        HPI:  Wellness     Exercise: No  Drinks water: Yes   Eat variety of fruits & vegetables, lean meats: some   Issues with sleep: No  Regular dental exams: Yes  Change in size/consistency of stool: No  Change in appearance of mole: No    Gyne Hx  OB History    Para Term  AB Living   0 0 0 0 0 0   SAB IAB Ectopic Multiple Live Births   0 0 0 0 0     Patient's last menstrual period was 2024 (within days).    CAGE Alcohol Screening       10/5/2023    11:28 AM   CAGE Flowsheet   Have you ever felt you should Cut down on your drinking? 0   Have people Annoyed you by criticizing your drinking? 0   Have you ever felt bad or Guilty about your drinking? 0   Have you ever had a drink first thing in the morning to steady your nerves or to get rid of a hangover (Eye opener)? 0   Total Score: Item responses on the CAGE are scored 0 or 1, with a higher score an indication of alcohol 0   Total Score: Item responses on the CAGE are scored 0 or 1, with a higher score an indication of alcohol No Risk        Depression Screening (PHQ-2/PHQ-9): Over the LAST 2 WEEKS   Little interest or pleasure in doing things: Not at all    Feeling down, depressed, or hopeless: Not at all    PHQ-2 SCORE: 0                  Also wants to discuss:     Worried about weight   Tried WW     Follow up     Asthma   Not using albuterol more than 1-2 times per month   Allergies and illneses are triggers       Allergies:  Allergies   Allergen Reactions    Bactrim Ds [Sulfamethoxazole W/Trimethoprim] RASH    Polysporin [Bacitracin-Polymyxin B] RASH      Current Meds:  Current Outpatient Medications on File Prior to Visit   Medication Sig Dispense Refill    rosuvastatin 5 MG Oral Tab TAKE 1 TABLET BY MOUTH EVERY DAY AT NIGHT 90 tablet 0    lisinopril-hydroCHLOROthiazide  10-12.5 MG Oral Tab TAKE 1 TABLET BY MOUTH EVERY DAY 90 tablet 0    Sodium Sulfate-Mag Sulfate-KCl (SUTAB) 1572-320-960 MG Oral Tab Starting at 4PM the night before your procedure open one bottle and take all 12 tablets with 16 ounces of water within 15-20 minutes. At 9PM open the second bottle and take all 12 tablets with another 16 ounces of water. 24 tablet 0    ELDERBERRY OR Take by mouth.      cetirizine 10 MG Oral Tab Take 1 tablet (10 mg total) by mouth daily.      Azelastine HCl 0.15 % Nasal Solution by Nasal route.      albuterol 108 (90 Base) MCG/ACT Inhalation Aero Soln Inhale 2 puffs into the lungs every 4 (four) hours as needed for Wheezing or Shortness of Breath. 8 g 2    Emollient (COLLAGEN EX) Apply topically.      diphenhydrAMINE HCl (BENADRYL ALLERGY OR) Take by mouth.      Multiple Vitamin (MULTI-VITAMIN DAILY OR) Take  by mouth.      PEG 3350-KCl-Na Bicarb-NaCl (TRILYTE) 420 g Oral Recon Soln Starting at 4:00 pm the night before procedure, drink 8 ounces of the prep every 15-20 minutes until finished 1 each 0    LEVOCETIRIZINE 5 MG Oral Tab TAKE 1 TABLET BY MOUTH EVERY DAY IN THE EVENING (Patient not taking: Reported on 10/8/2024) 90 tablet 3     No current facility-administered medications on file prior to visit.        History:  Past Medical History:    Allergic rhinitis    Asthma (HCC)    COVID    Oct 4    Esophageal reflux    Essential hypertension    Hiatal hernia    High blood pressure    High cholesterol    Hyperlipidemia    Obesity, unspecified    Pneumonia, organism unspecified(486)    Visual impairment    GLASSES      Past Surgical History:   Procedure Laterality Date    Colonoscopy N/A 4/11/2024    Procedure: COLONOSCOPY;  Surgeon: Charo Keith MD;  Location:  ENDOSCOPY    Other surgical history      wisdom teeth    Tonsillectomy  1986      Family History   Problem Relation Age of Onset    Hypertension Mother     Other (thyroid cancer) Mother 50        cancer free at 65. Quit  smoking around .    Hypertension Father     Heart Disorder Father 50        MI, CABG    Diabetes Father     Other (cva) Maternal Grandmother     Other (lung cancer) Paternal Grandfather 62         at 62 from cancer. Smoker.     Breast Cancer Maternal Great-Grandmother 80        dx age 80      Family Status   Relation Status    Mo Alive    Fa Alive    MGMA     PGFA     MGFA     PGMA Alive    Sis Alive    Bro Alive    Mat Great GM       Social History     Socioeconomic History    Marital status: Single   Tobacco Use    Smoking status: Former     Current packs/day: 0.00     Average packs/day: 0.5 packs/day for 13.0 years (6.5 ttl pk-yrs)     Types: Cigarettes     Start date: 1997     Quit date: 2010     Years since quittin.9    Smokeless tobacco: Never   Vaping Use    Vaping status: Never Used   Substance and Sexual Activity    Alcohol use: Yes     Alcohol/week: 2.0 standard drinks of alcohol     Types: 2 Glasses of wine per week     Comment: occasionally    Drug use: No    Sexual activity: Not Currently     Partners: Male     Birth control/protection: Abstinence   Other Topics Concern    Caffeine Concern No    Exercise No    Seat Belt No    Special Diet No    Weight Concern No            Immunization History   Administered Date(s) Administered    Covid-19 Vaccine Pfizer 30 mcg/0.3 ml 2021, 2021, 10/30/2021    Covid-19 Vaccine Pfizer Bivalent 30mcg/0.3mL 2022    FLULAVAL 6 months & older 0.5 ml Prefilled syringe (49264) 2021, 10/21/2022    FLUZONE 6 months and older PFS 0.5 ml ([id number]) 10/05/2023    Influenza 2017, 10/01/2018, 10/01/2019, 10/05/2020    Influenza Vaccine, trivalent (IIV3), PF 0.5mL ([id number]) 10/08/2024    Pfizer Covid-19 Vaccine 30mcg/0.3ml 12yrs+ 10/21/2023    Pneumovax 23 2021    TDAP 10/21/2022       Wt Readings from Last 6 Encounters:   10/08/24 233 lb (105.7 kg)   24 230 lb (104.3 kg)   24 230 lb  (104.3 kg)   02/27/24 238 lb (108 kg)   10/05/23 223 lb (101.2 kg)   05/23/23 233 lb (105.7 kg)       BP Readings from Last 3 Encounters:   10/08/24 124/82   06/28/24 138/80   04/11/24 108/67       REVIEW OF SYSTEMS:   Review of Systems   Constitutional:  Negative for chills, fever and malaise/fatigue.   HENT:  Negative for congestion, ear pain and sore throat.    Eyes:  Negative for blurred vision, double vision and pain.   Respiratory:  Negative for cough, shortness of breath and wheezing.    Cardiovascular:  Negative for chest pain, palpitations and leg swelling.   Gastrointestinal:  Negative for abdominal pain, blood in stool, constipation, diarrhea, melena, nausea and vomiting.   Genitourinary:  Negative for dysuria, flank pain and frequency.   Musculoskeletal:  Positive for joint pain. Negative for back pain and myalgias.   Skin:  Negative for itching and rash.   Neurological:  Negative for dizziness, weakness and headaches.   Endo/Heme/Allergies:  Negative for environmental allergies and polydipsia. Does not bruise/bleed easily.   Psychiatric/Behavioral:  Negative for depression. The patient is not nervous/anxious and does not have insomnia.        EXAM:   /82   Pulse 78   Ht 5' 5\" (1.651 m)   Wt 233 lb (105.7 kg)   LMP 06/06/2024 (Within Days)   SpO2 98%   BMI 38.77 kg/m²   Body mass index is 38.77 kg/m².  Patient's last menstrual period was 06/06/2024 (within days).    Physical Exam  Constitutional:       General: She is not in acute distress.     Appearance: She is obese.   HENT:      Right Ear: Tympanic membrane normal.      Left Ear: Tympanic membrane normal.      Mouth/Throat:      Mouth: Mucous membranes are moist.      Pharynx: Oropharynx is clear. No posterior oropharyngeal erythema.   Eyes:      Extraocular Movements: Extraocular movements intact.      Conjunctiva/sclera: Conjunctivae normal.      Pupils: Pupils are equal, round, and reactive to light.   Cardiovascular:      Rate and  Rhythm: Normal rate and regular rhythm.      Pulses: Normal pulses.      Heart sounds: Normal heart sounds.   Pulmonary:      Effort: Pulmonary effort is normal.      Breath sounds: Normal breath sounds. No wheezing or rhonchi.   Abdominal:      General: Abdomen is flat. Bowel sounds are normal.      Palpations: Abdomen is soft.      Tenderness: There is no abdominal tenderness. There is no guarding.   Musculoskeletal:         General: No swelling, tenderness, deformity or signs of injury.      Cervical back: Neck supple.   Lymphadenopathy:      Cervical: No cervical adenopathy.   Skin:     General: Skin is warm and dry.      Findings: No rash.   Neurological:      General: No focal deficit present.      Mental Status: She is alert and oriented to person, place, and time.      Motor: No weakness.   Psychiatric:         Mood and Affect: Mood normal.         Behavior: Behavior normal.         Thought Content: Thought content normal.                ASSESSMENT/PLAN:      1. Routine physical examination    2. Need for vaccination  - INFLUENZA VACCINE, TRI, PRESERV FREE, 0.5 ML    3. Class 2 severe obesity due to excess calories with serious comorbidity and body mass index (BMI) of 38.0 to 38.9 in adult ([facility])  - Jump Start Your Health; Future  - Lipid Panel; Future  - Hemoglobin A1C; Future  - Comp Metabolic Panel (14) [E]; Future    4. Essential hypertension    5. Mixed hyperlipidemia  - Lipid Panel; Future    6. Mild persistent asthma without complication ([facility])  Stable controlled        Health Maintenance   Topic Date Due    Asthma Control Test  12/26/2019    Pneumococcal Vaccine: Birth to 64yrs (2 of 2 - PCV) 07/22/2022    Annual Physical  10/05/2024    Mammogram  07/17/2025    Pap Smear  03/16/2028    DTaP,Tdap,and Td Vaccines (2 - Td or Tdap) 10/21/2032    Colorectal Cancer Screening  04/11/2034    Influenza Vaccine  Completed    Annual Depression Screening  Completed    COVID-19 Vaccine  Completed         Healthy  diet and regular exercise discussed     Meds & Refills for this Visit:  Requested Prescriptions      No prescriptions requested or ordered in this encounter         Imaging & Consults:  INFLUENZA VACCINE, TRI, PRESERV FREE, 0.5 ML  OP REFERRAL JUMP START YOUR HEALTH    Return in about 6 months (around 4/8/2025).

## 2025-01-04 RX ORDER — LISINOPRIL AND HYDROCHLOROTHIAZIDE 10; 12.5 MG/1; MG/1
1 TABLET ORAL DAILY
Qty: 90 TABLET | Refills: 0 | Status: SHIPPED | OUTPATIENT
Start: 2025-01-04

## 2025-01-04 RX ORDER — ROSUVASTATIN CALCIUM 5 MG/1
5 TABLET, COATED ORAL NIGHTLY
Qty: 90 TABLET | Refills: 0 | Status: SHIPPED | OUTPATIENT
Start: 2025-01-04

## 2025-01-31 ENCOUNTER — LAB ENCOUNTER (OUTPATIENT)
Dept: LAB | Age: 45
End: 2025-01-31
Attending: PHYSICIAN ASSISTANT
Payer: COMMERCIAL

## 2025-01-31 ENCOUNTER — E-VISIT (OUTPATIENT)
Dept: TELEHEALTH | Age: 45
End: 2025-01-31
Payer: COMMERCIAL

## 2025-01-31 DIAGNOSIS — R39.9 UTI SYMPTOMS: Primary | ICD-10-CM

## 2025-01-31 DIAGNOSIS — R39.9 UTI SYMPTOMS: ICD-10-CM

## 2025-01-31 PROCEDURE — 87086 URINE CULTURE/COLONY COUNT: CPT

## 2025-01-31 NOTE — PROGRESS NOTES
Joaquina Monique is a 45 year old female.  HPI:   See answers to questions above.     Current Outpatient Medications   Medication Sig Dispense Refill    nitrofurantoin monohydrate macro 100 MG Oral Cap Take 1 capsule (100 mg total) by mouth 2 (two) times daily for 5 days. 10 capsule 0    ROSUVASTATIN 5 MG Oral Tab TAKE 1 TABLET BY MOUTH EVERY DAY AT NIGHT 90 tablet 0    LISINOPRIL-HYDROCHLOROTHIAZIDE 10-12.5 MG Oral Tab TAKE 1 TABLET BY MOUTH EVERY DAY 90 tablet 0    PEG 3350-KCl-Na Bicarb-NaCl (TRILYTE) 420 g Oral Recon Soln Starting at 4:00 pm the night before procedure, drink 8 ounces of the prep every 15-20 minutes until finished 1 each 0    Sodium Sulfate-Mag Sulfate-KCl (SUTAB) 8073-096-917 MG Oral Tab Starting at 4PM the night before your procedure open one bottle and take all 12 tablets with 16 ounces of water within 15-20 minutes. At 9PM open the second bottle and take all 12 tablets with another 16 ounces of water. 24 tablet 0    ELDERBERRY OR Take by mouth.      cetirizine 10 MG Oral Tab Take 1 tablet (10 mg total) by mouth daily.      Azelastine HCl 0.15 % Nasal Solution by Nasal route.      LEVOCETIRIZINE 5 MG Oral Tab TAKE 1 TABLET BY MOUTH EVERY DAY IN THE EVENING (Patient not taking: Reported on 10/8/2024) 90 tablet 3    albuterol 108 (90 Base) MCG/ACT Inhalation Aero Soln Inhale 2 puffs into the lungs every 4 (four) hours as needed for Wheezing or Shortness of Breath. 8 g 2    Emollient (COLLAGEN EX) Apply topically.      diphenhydrAMINE HCl (BENADRYL ALLERGY OR) Take by mouth.      Multiple Vitamin (MULTI-VITAMIN DAILY OR) Take  by mouth.        Past Medical History:    Allergic rhinitis    Asthma (HCC)    COVID    Oct 4    Esophageal reflux    Essential hypertension    Hiatal hernia    High blood pressure    High cholesterol    Hyperlipidemia    Obesity, unspecified    Pneumonia, organism unspecified(486)    Visual impairment    GLASSES      Past Surgical History:   Procedure Laterality Date     Colonoscopy N/A 2024    Procedure: COLONOSCOPY;  Surgeon: Charo Keith MD;  Location:  ENDOSCOPY    Other surgical history      wisdom teeth    Tonsillectomy        Family History   Problem Relation Age of Onset    Hypertension Mother     Other (thyroid cancer) Mother 50        cancer free at 65. Quit smoking around .    Hypertension Father     Heart Disorder Father 50        MI, CABG    Diabetes Father     Other (cva) Maternal Grandmother     Other (lung cancer) Paternal Grandfather 62         at 62 from cancer. Smoker.     Breast Cancer Maternal Great-Grandmother 80        dx age 80      Social History:  Social History     Socioeconomic History    Marital status: Single   Tobacco Use    Smoking status: Former     Current packs/day: 0.00     Average packs/day: 0.5 packs/day for 13.0 years (6.5 ttl pk-yrs)     Types: Cigarettes     Start date: 1997     Quit date: 2010     Years since quittin.2    Smokeless tobacco: Never   Vaping Use    Vaping status: Never Used   Substance and Sexual Activity    Alcohol use: Yes     Alcohol/week: 2.0 standard drinks of alcohol     Types: 2 Glasses of wine per week     Comment: occasionally    Drug use: No    Sexual activity: Not Currently     Partners: Male     Birth control/protection: Abstinence   Other Topics Concern    Caffeine Concern No    Exercise No    Seat Belt No    Special Diet No    Weight Concern No         ASSESSMENT AND PLAN:     Encounter Diagnosis   Name Primary?    UTI symptoms Yes     Lab did not run urine dip. Cancelled. Sent in Rx for abx while awaiting urine cx result. Urine culture showed multiple organisms. Probable contamination. No indication for abx. To f/u with PCP if still symptomatic       Meds & Refills for this Visit:  Requested Prescriptions     Signed Prescriptions Disp Refills    nitrofurantoin monohydrate macro 100 MG Oral Cap 10 capsule 0     Sig: Take 1 capsule (100 mg total) by mouth 2 (two) times daily  for 5 days.       Duration of  the service:  20 minutes    Patient advised to follow up with PCP if no improvement or worsening of symptoms  Refer to [facility] message for specific patient instructions

## 2025-02-01 RX ORDER — CEFADROXIL 500 MG/1
500 CAPSULE ORAL 2 TIMES DAILY
Qty: 14 CAPSULE | Refills: 0 | Status: CANCELLED
Start: 2025-02-01 | End: 2025-02-08

## 2025-02-01 RX ORDER — NITROFURANTOIN 25; 75 MG/1; MG/1
100 CAPSULE ORAL 2 TIMES DAILY
Qty: 10 CAPSULE | Refills: 0 | Status: SHIPPED | OUTPATIENT
Start: 2025-02-01 | End: 2025-02-06

## 2025-02-02 PROCEDURE — 99422 OL DIG E/M SVC 11-20 MIN: CPT | Performed by: PHYSICIAN ASSISTANT

## 2025-02-22 ENCOUNTER — OFFICE VISIT (OUTPATIENT)
Dept: FAMILY MEDICINE CLINIC | Facility: CLINIC | Age: 45
End: 2025-02-22
Payer: COMMERCIAL

## 2025-02-22 VITALS
SYSTOLIC BLOOD PRESSURE: 124 MMHG | BODY MASS INDEX: 43.15 KG/M2 | HEIGHT: 65 IN | OXYGEN SATURATION: 98 % | HEART RATE: 76 BPM | DIASTOLIC BLOOD PRESSURE: 82 MMHG | WEIGHT: 259 LBS

## 2025-02-22 DIAGNOSIS — R10.13 EPIGASTRIC PAIN: ICD-10-CM

## 2025-02-22 DIAGNOSIS — M54.9 BACK PAIN, UNSPECIFIED BACK LOCATION, UNSPECIFIED BACK PAIN LATERALITY, UNSPECIFIED CHRONICITY: ICD-10-CM

## 2025-02-22 DIAGNOSIS — K44.9 HIATAL HERNIA: Primary | ICD-10-CM

## 2025-02-22 PROCEDURE — 3079F DIAST BP 80-89 MM HG: CPT | Performed by: FAMILY MEDICINE

## 2025-02-22 PROCEDURE — 99214 OFFICE O/P EST MOD 30 MIN: CPT | Performed by: FAMILY MEDICINE

## 2025-02-22 PROCEDURE — 3008F BODY MASS INDEX DOCD: CPT | Performed by: FAMILY MEDICINE

## 2025-02-22 PROCEDURE — 3074F SYST BP LT 130 MM HG: CPT | Performed by: FAMILY MEDICINE

## 2025-02-22 NOTE — PROGRESS NOTES
Subjective:   Joaquina Monique is a 45 year old female who presents for Follow - Up (Lower right back pain and also is having stomach pain)            The following individual(s) verbally consented to be recorded using ambient AI listening technology and understand that they can each withdraw their consent to this listening technology at any point by asking the clinician to turn off or pause the recording:    Patient name: Joaquina Monique      History/Other:   History of Present Illness  Joaquina Monique is a 45 year old female with a history of hiatal hernia and acid reflux who presents with persistent back and abdominal pain.    She has been experiencing intermittent abdominal pain since last year, described as a 'lightning bolt' sensation originating from the periumbilical area. The pain occurs suddenly and resolves quickly, with a recent episode occurring about two weeks ago while eating. She notices occasional flickers of pain around the belly button, particularly when lying down. The pain is internal and not exacerbated by pressure. Her past medical history includes a diagnosis of hiatal hernia and acid reflux in her twenties, for which she was prescribed medication that she eventually stopped taking. She is uncertain if her current symptoms are related to her previous conditions or other issues such as her gallbladder. Family history reveals that her mother and sister both had their gallbladders removed.    She reports localized back pain since around Sup[place]ya, described as 'irritated' rather than severe, and it does not interfere with her daily activities. The pain is more noticeable when bending or moving side to side, but not when pressing on the area or during certain movements like raising her arms or bending forward. She attributes some of her back pain to weight gain, which has made physical activities more challenging.    She acknowledges a history of weight gain, which she attributes to poor eating  habits and a lack of willpower to maintain a healthy diet. She has attempted weight loss through programs like [employer] but has struggled to maintain progress. She works for [employer] and struggles with maintaining a healthy diet and weight loss.       Chief Complaint Reviewed and Verified  Nursing Notes Reviewed and   Verified  Tobacco Reviewed  Allergies Reviewed  Medications Reviewed    Problem List Reviewed  Medical History Reviewed  Surgical History   Reviewed  Family History Reviewed  Social History Reviewed         Tobacco:  She smoked tobacco in the past but quit greater than 12 months ago.  Social History     Tobacco Use   Smoking Status Former    Current packs/day: 0.00    Average packs/day: 0.6 packs/day for 16.5 years (10.0 ttl pk-yrs)    Types: Cigarettes    Start date: 1997    Quit date: 2014    Years since quittin.1   Smokeless Tobacco Never        Current Outpatient Medications   Medication Sig Dispense Refill    ROSUVASTATIN 5 MG Oral Tab TAKE 1 TABLET BY MOUTH EVERY DAY AT NIGHT 90 tablet 0    LISINOPRIL-HYDROCHLOROTHIAZIDE 10-12.5 MG Oral Tab TAKE 1 TABLET BY MOUTH EVERY DAY 90 tablet 0    Sodium Sulfate-Mag Sulfate-KCl (SUTAB) 3575-535-221 MG Oral Tab Starting at 4PM the night before your procedure open one bottle and take all 12 tablets with 16 ounces of water within 15-20 minutes. At 9PM open the second bottle and take all 12 tablets with another 16 ounces of water. 24 tablet 0    ELDERBERRY OR Take by mouth.      cetirizine 10 MG Oral Tab Take 1 tablet (10 mg total) by mouth daily.      Azelastine HCl 0.15 % Nasal Solution by Nasal route.      albuterol 108 (90 Base) MCG/ACT Inhalation Aero Soln Inhale 2 puffs into the lungs every 4 (four) hours as needed for Wheezing or Shortness of Breath. 8 g 2    Emollient (COLLAGEN EX) Apply topically.      diphenhydrAMINE HCl (BENADRYL ALLERGY OR) Take by mouth.      Multiple Vitamin (MULTI-VITAMIN DAILY OR)  Take  by mouth.      PEG 3350-KCl-Na Bicarb-NaCl (TRILYTE) 420 g Oral Recon Soln Starting at 4:00 pm the night before procedure, drink 8 ounces of the prep every 15-20 minutes until finished 1 each 0    LEVOCETIRIZINE 5 MG Oral Tab TAKE 1 TABLET BY MOUTH EVERY DAY IN THE EVENING (Patient not taking: Reported on 2/22/2025) 90 tablet 3         Review of Systems:  Review of Systems   Constitutional:  Negative for chills and fever.   HENT:  Negative for rhinorrhea and sinus pressure.    Eyes:  Negative for pain and visual disturbance.   Respiratory:  Negative for cough and shortness of breath.    Cardiovascular:  Negative for chest pain and palpitations.   Gastrointestinal:  Positive for abdominal pain. Negative for blood in stool, nausea and vomiting.   Genitourinary:  Negative for dysuria, frequency, hematuria and urgency.   Musculoskeletal:  Positive for back pain. Negative for arthralgias and myalgias.   Skin:  Negative for pallor and rash.   Neurological:  Negative for dizziness and headaches.         Objective:   /82   Pulse 76   Ht 5' 5\" (1.651 m)   Wt 259 lb (117.5 kg)   LMP 02/16/2025   SpO2 98%   BMI 43.10 kg/m²  Estimated body mass index is 43.1 kg/m² as calculated from the following:    Height as of this encounter: 5' 5\" (1.651 m).    Weight as of this encounter: 259 lb (117.5 kg).  Physical Exam  Constitutional:       General: She is not in acute distress.  Cardiovascular:      Rate and Rhythm: Normal rate and regular rhythm.   Pulmonary:      Effort: Pulmonary effort is normal.      Breath sounds: Normal breath sounds.   Abdominal:      General: Bowel sounds are normal. There is no distension.      Palpations: Abdomen is soft. There is no mass.   Musculoskeletal:      Cervical back: Normal.      Thoracic back: Normal.      Lumbar back: Normal.   Skin:     Findings: No rash.   Neurological:      General: No focal deficit present.      Mental Status: She is alert and oriented to person, place,  and time.   Psychiatric:         Mood and Affect: Mood normal.         Behavior: Behavior normal.       Results        Assessment & Plan:   1. Hiatal hernia (Primary)  -     XR UGI/ESOPHAGUS DOUBLE CONTRAST (CPT=74246); Future; Expected date: 02/22/2025  2. Epigastric pain  -     US ABDOMEN COMPLETE (CPT=76700); Future; Expected date: 02/22/2025  3. Back pain, unspecified back location, unspecified back pain laterality, unspecified chronicity    Assessment & Plan  Back Pain  Chronic, localized, non-radiating pain. Likely musculoskeletal in nature, possibly related to weight gain and posture. No signs of sciatica or low back pain.  -Provide exercises for back pain management.    Abdominal Pain  Episodic, sharp pain around the umbilicus. History of hiatal hernia and acid reflux. Possible relation to weight gain and dietary habits.  -Order abdominal X-ray to assess hiatal hernia.  -Order ultrasound to evaluate gallbladder.    Weight Gain  Patient acknowledges unhealthy eating habits and lack of exercise. Expressed interest in rejoining [employer] or trying [employer].  -Encourage patient to rejoin [employer] or consider [employer] for weight loss.  -Emphasize the importance of weight loss in managing back and abdominal pain.    General Health Maintenance  -Repeat labs in April unless ultrasound or liver enzyme results indicate a need for earlier testing.      Return in about 3 months (around 5/22/2025).      MEHDI HERNANDEZ MD, 2/22/2025, 11:16 AM

## 2025-03-07 ENCOUNTER — HOSPITAL ENCOUNTER (OUTPATIENT)
Dept: GENERAL RADIOLOGY | Age: 45
Discharge: HOME | End: 2025-03-07
Attending: FAMILY MEDICINE
Payer: COMMERCIAL

## 2025-03-07 DIAGNOSIS — K44.9 HIATAL HERNIA: ICD-10-CM

## 2025-03-07 PROCEDURE — 74246 X-RAY XM UPR GI TRC 2CNTRST: CPT | Performed by: FAMILY MEDICINE

## 2025-03-15 ENCOUNTER — HOSPITAL ENCOUNTER (OUTPATIENT)
Dept: ULTRASOUND IMAGING | Age: 45
Discharge: HOME | End: 2025-03-15
Attending: FAMILY MEDICINE
Payer: COMMERCIAL

## 2025-03-15 DIAGNOSIS — R10.13 EPIGASTRIC PAIN: ICD-10-CM

## 2025-03-15 PROCEDURE — 76700 US EXAM ABDOM COMPLETE: CPT | Performed by: FAMILY MEDICINE

## 2025-03-29 ENCOUNTER — OFFICE VISIT (OUTPATIENT)
Dept: FAMILY MEDICINE CLINIC | Facility: CLINIC | Age: 45
End: 2025-03-29
Payer: COMMERCIAL

## 2025-03-29 VITALS
OXYGEN SATURATION: 98 % | HEART RATE: 119 BPM | DIASTOLIC BLOOD PRESSURE: 86 MMHG | TEMPERATURE: 97 F | SYSTOLIC BLOOD PRESSURE: 135 MMHG | RESPIRATION RATE: 18 BRPM

## 2025-03-29 DIAGNOSIS — U07.1 POSITIVE SELF-ADMINISTERED ANTIGEN TEST FOR COVID-19: Primary | ICD-10-CM

## 2025-03-29 DIAGNOSIS — H92.01 RIGHT EAR PAIN: ICD-10-CM

## 2025-03-29 PROCEDURE — 3075F SYST BP GE 130 - 139MM HG: CPT | Performed by: NURSE PRACTITIONER

## 2025-03-29 PROCEDURE — 3079F DIAST BP 80-89 MM HG: CPT | Performed by: NURSE PRACTITIONER

## 2025-03-29 PROCEDURE — 99213 OFFICE O/P EST LOW 20 MIN: CPT | Performed by: NURSE PRACTITIONER

## 2025-03-29 NOTE — PROGRESS NOTES
CHIEF COMPLAINT:     Chief Complaint   Patient presents with    Ear Problem     Right ear pain, congestion       HPI:   Joaquina Monique is a 45 year old female who presents to clinic today with complaints of right ear pain. Has had for 1  days. Pain is described as sharp, fullness.  Patient reports history of ear infections.  Associated symptoms: cough and congestion. Denies sob or wheezing. Taking benadryl and nasal spray for symptoms.   Positive at home covid test on Tuesday.  Feels better.   On daily zyrtec.     Current Outpatient Medications   Medication Sig Dispense Refill    pantoprazole 40 MG Oral Tab EC Take 1 tablet (40 mg total) by mouth every morning before breakfast. 90 tablet 0    ROSUVASTATIN 5 MG Oral Tab TAKE 1 TABLET BY MOUTH EVERY DAY AT NIGHT 90 tablet 0    LISINOPRIL-HYDROCHLOROTHIAZIDE 10-12.5 MG Oral Tab TAKE 1 TABLET BY MOUTH EVERY DAY 90 tablet 0    ELDERBERRY OR Take by mouth.      cetirizine 10 MG Oral Tab Take 1 tablet (10 mg total) by mouth daily.      Azelastine HCl 0.15 % Nasal Solution by Nasal route.      LEVOCETIRIZINE 5 MG Oral Tab TAKE 1 TABLET BY MOUTH EVERY DAY IN THE EVENING (Patient not taking: Reported on 2/22/2025) 90 tablet 3    albuterol 108 (90 Base) MCG/ACT Inhalation Aero Soln Inhale 2 puffs into the lungs every 4 (four) hours as needed for Wheezing or Shortness of Breath. 8 g 2    Emollient (COLLAGEN EX) Apply topically.      diphenhydrAMINE HCl (BENADRYL ALLERGY OR) Take by mouth.      Multiple Vitamin (MULTI-VITAMIN DAILY OR) Take  by mouth.        Past Medical History:    Allergic rhinitis    Asthma (HCC)    COVID    Oct 4    Esophageal reflux    Essential hypertension    Hiatal hernia    High blood pressure    High cholesterol    Hyperlipidemia    Obesity, unspecified    Pneumonia, organism unspecified(486)    Visual impairment    GLASSES      Social History:  Social History     Socioeconomic History    Marital status: Single   Tobacco Use    Smoking status:  Former     Current packs/day: 0.00     Average packs/day: 0.6 packs/day for 16.5 years (10.0 ttl pk-yrs)     Types: Cigarettes     Start date: 1997     Quit date: 2014     Years since quittin.1    Smokeless tobacco: Never   Vaping Use    Vaping status: Never Used   Substance and Sexual Activity    Alcohol use: Not Currently     Alcohol/week: 1.0 standard drink of alcohol     Types: 1 Glasses of wine per week     Comment: occasionally    Drug use: Never    Sexual activity: Not Currently     Partners: Male     Birth control/protection: Abstinence   Other Topics Concern    Caffeine Concern No    Exercise No    Seat Belt Yes    Special Diet No    Stress Concern No    Weight Concern No        REVIEW OF SYSTEMS:   GENERAL: Feeling well otherwise.    SKIN: no unusual skin lesions or rashes  HEENT: See HPI  LUNGS: No cough, shortness of breath, or wheezing.  CARDIOVASCULAR: No chest pain, palpitations  GI: No N/V/C/D.  NEURO: denies headaches or dizziness    EXAM:   /86   Pulse 119   Temp 97.1 °F (36.2 °C)   Resp 18   LMP 2025   SpO2 98%   GENERAL: well developed, well nourished,in no apparent distress  SKIN: no rashes,no suspicious lesions  HEAD: atraumatic, normocephalic  EYES: conjunctiva clear, EOM intact  EARS: Tragus non tender on palpation bilaterally. External auditory canals healthy.   Right TM: gray, + bulging, no retraction, no effusion, bony landmarks intact.   Left TM: gray, + bulging, no retraction,no effusion, bony landmarks intact.  NOSE: nostrils patent, no nasal discharge, nasal mucosa pink and noninflamed  THROAT: oral mucosa pink, moist. Posterior pharynx is not erythematous or injected. No exudates.  NECK: supple, non-tender  LUNGS: clear to auscultation bilaterally, no wheezes or rhonchi. No diminished breath sounds. Breathing is non labored.  CARDIO: RRR without murmur  EXTREMITIES: no cyanosis, clubbing or edema  LYMPH: no auricular lymphadenopathy; bilateral cervical  lymphadenopathy.      ASSESSMENT AND PLAN:   Joaquina Monique is a 45 year old female who presents with:    ASSESSMENT:  Encounter Diagnoses   Name Primary?    Positive self-administered antigen test for COVID-19 Yes    Right ear pain      1. Positive self-administered antigen test for COVID-19    2. Right ear pain      Pt with currently covid infection with improved symptoms. Now with ear discomfort.   Recommend increase fluids, saline rinses, can add mucinex to help with congestion.   No bacterial infection on exam. Pt with elevated HR, reports she is very nervous when coming to clinic. Is decreasing on recheck. No chest pain or shortness of breath.     PLAN: Meds and instructions as listed below.  Tylenol/Motrin prn pain.    Comfort measures as described in Patient Instructions.    Risks, benefits, and side effects of medication explained and discussed.  Follow up with PCP if s/sx worsen, do not improve in 3 days, or if fever of 100.4 or greater persists for 72 hrs.    Requested Prescriptions      No prescriptions requested or ordered in this encounter         Patient voiced understanding and is in agreement with treatment plan

## 2025-04-04 RX ORDER — ROSUVASTATIN CALCIUM 5 MG/1
5 TABLET, COATED ORAL NIGHTLY
Qty: 90 TABLET | Refills: 3 | Status: SHIPPED | OUTPATIENT
Start: 2025-04-04

## 2025-04-04 RX ORDER — LISINOPRIL AND HYDROCHLOROTHIAZIDE 10; 12.5 MG/1; MG/1
1 TABLET ORAL DAILY
Qty: 90 TABLET | Refills: 3 | Status: SHIPPED | OUTPATIENT
Start: 2025-04-04

## 2025-04-05 ENCOUNTER — TELEPHONE (OUTPATIENT)
Dept: FAMILY MEDICINE CLINIC | Facility: CLINIC | Age: 45
End: 2025-04-05

## 2025-04-05 ENCOUNTER — OFFICE VISIT (OUTPATIENT)
Dept: FAMILY MEDICINE CLINIC | Facility: CLINIC | Age: 45
End: 2025-04-05
Payer: COMMERCIAL

## 2025-04-05 VITALS
HEIGHT: 65 IN | WEIGHT: 244 LBS | OXYGEN SATURATION: 98 % | DIASTOLIC BLOOD PRESSURE: 88 MMHG | SYSTOLIC BLOOD PRESSURE: 130 MMHG | RESPIRATION RATE: 16 BRPM | HEART RATE: 113 BPM | BODY MASS INDEX: 40.65 KG/M2

## 2025-04-05 DIAGNOSIS — J01.10 ACUTE NON-RECURRENT FRONTAL SINUSITIS: Primary | ICD-10-CM

## 2025-04-05 DIAGNOSIS — J01.10 ACUTE NON-RECURRENT FRONTAL SINUSITIS: ICD-10-CM

## 2025-04-05 PROCEDURE — 3008F BODY MASS INDEX DOCD: CPT | Performed by: NURSE PRACTITIONER

## 2025-04-05 PROCEDURE — 99214 OFFICE O/P EST MOD 30 MIN: CPT | Performed by: NURSE PRACTITIONER

## 2025-04-05 PROCEDURE — 3075F SYST BP GE 130 - 139MM HG: CPT | Performed by: NURSE PRACTITIONER

## 2025-04-05 PROCEDURE — 3079F DIAST BP 80-89 MM HG: CPT | Performed by: NURSE PRACTITIONER

## 2025-04-05 RX ORDER — FLUTICASONE PROPIONATE 50 MCG
SPRAY, SUSPENSION (ML) NASAL DAILY
COMMUNITY

## 2025-04-05 RX ORDER — METHYLPREDNISOLONE 4 MG/1
TABLET ORAL
Qty: 21 EACH | Refills: 0 | Status: SHIPPED | OUTPATIENT
Start: 2025-04-05 | End: 2025-04-05

## 2025-04-05 RX ORDER — METHYLPREDNISOLONE 4 MG/1
TABLET ORAL
Qty: 21 EACH | Refills: 0 | Status: SHIPPED | OUTPATIENT
Start: 2025-04-05

## 2025-04-05 NOTE — TELEPHONE ENCOUNTER
Refill passed per [facility] protocols.    Requested Prescriptions   Pending Prescriptions Disp Refills    ROSUVASTATIN 5 MG Oral Tab [Pharmacy Med Name: ROSUVASTATIN CALCIUM 5 MG TAB] 90 tablet 3     Sig: TAKE 1 TABLET BY MOUTH EVERY DAY AT NIGHT       Cholesterol Medication Protocol Passed - 4/4/2025  7:01 PM       LISINOPRIL-HYDROCHLOROTHIAZIDE 10-12.5 MG Oral Tab [Pharmacy Med Name: LISINOPRIL-HCTZ 10-12.5 MG TAB] 90 tablet 3     Sig: TAKE 1 TABLET BY MOUTH EVERY DAY       Hypertension Medications Protocol Passed - 4/4/2025  7:01 PM

## 2025-04-05 NOTE — PROGRESS NOTES
Chief Complaint   Patient presents with    Ear Problem     Right ear pain         Joaquina Monique is a 45 year old female who presents for  R ear pain and   Fulness sensation. Problem last  7  days. Not worse or better, no sick contact. No ear discharge. No swelling of the ear.  Positive  sinus congestion, post nasal drainage , history of Covid -19 2 weeks ago , history of Asthma .             Current Outpatient Medications   Medication Sig Dispense Refill    fluticasone propionate 50 MCG/ACT Nasal Suspension by Each Nare route daily.      rosuvastatin 5 MG Oral Tab Take 1 tablet (5 mg total) by mouth nightly. 90 tablet 3    lisinopril-hydroCHLOROthiazide 10-12.5 MG Oral Tab Take 1 tablet by mouth daily. 90 tablet 3    pantoprazole 40 MG Oral Tab EC Take 1 tablet (40 mg total) by mouth every morning before breakfast. 90 tablet 0    ELDERBERRY OR Take by mouth.      cetirizine 10 MG Oral Tab Take 1 tablet (10 mg total) by mouth daily.      Azelastine HCl 0.15 % Nasal Solution by Nasal route.      LEVOCETIRIZINE 5 MG Oral Tab TAKE 1 TABLET BY MOUTH EVERY DAY IN THE EVENING 90 tablet 3    albuterol 108 (90 Base) MCG/ACT Inhalation Aero Soln Inhale 2 puffs into the lungs every 4 (four) hours as needed for Wheezing or Shortness of Breath. 8 g 2    Emollient (COLLAGEN EX) Apply topically.      diphenhydrAMINE HCl (BENADRYL ALLERGY OR) Take by mouth.      Multiple Vitamin (MULTI-VITAMIN DAILY OR) Take  by mouth.        Past Medical History:    Allergic rhinitis    Asthma (HCC)    COVID    Oct 4    Esophageal reflux    Essential hypertension    Hiatal hernia    High blood pressure    High cholesterol    Hyperlipidemia    Obesity, unspecified    Pneumonia, organism unspecified(486)    Visual impairment    GLASSES      Past Surgical History:   Procedure Laterality Date    Colonoscopy N/A 4/11/2024    Procedure: COLONOSCOPY;  Surgeon: Charo Keith MD;  Location:  ENDOSCOPY    Other surgical history      wisdom teeth     Tonsillectomy        Family History   Problem Relation Age of Onset    Hypertension Mother     Other (thyroid cancer) Mother 50        cancer free at 65. Quit smoking around 2015.    Hypertension Father     Heart Disorder Father 50        MI, CABG    Diabetes Father     Other (cva) Maternal Grandmother     Other (lung cancer) Paternal Grandfather 62         at 62 from cancer. Smoker.     Breast Cancer Maternal Great-Grandmother 80        dx age 80      Social History     Socioeconomic History    Marital status: Single   Tobacco Use    Smoking status: Former     Current packs/day: 0.00     Average packs/day: 0.6 packs/day for 16.5 years (10.0 ttl pk-yrs)     Types: Cigarettes     Start date: 1997     Quit date: 2014     Years since quittin.2    Smokeless tobacco: Never   Vaping Use    Vaping status: Never Used   Substance and Sexual Activity    Alcohol use: Not Currently     Alcohol/week: 1.0 standard drink of alcohol     Types: 1 Glasses of wine per week     Comment: occasionally    Drug use: Never    Sexual activity: Not Currently     Partners: Male     Birth control/protection: Abstinence   Other Topics Concern    Caffeine Concern No    Exercise No    Seat Belt Yes    Special Diet No    Stress Concern No    Weight Concern No     Social Drivers of Health     Food Insecurity: Patient Declined (2025)    NCSS - Food Insecurity     Worried About Running Out of Food in the Last Year: Patient declined     Ran Out of Food in the Last Year: Patient declined   Transportation Needs: No Transportation Needs (2025)    NCSS - Transportation     Lack of Transportation: No   Housing Stability: Patient Declined (2025)    NCSS - Housing/Utilities     Has Housing: Patient declined     Worried About Losing Housing: Patient declined     Unable to Get Utilities: Patient declined         REVIEW OF SYSTEMS:   GENERAL: feels well otherwise, no fever, no chills.  SKIN: no rashes  EYES:denies any other  cold symptoms, no stuffy nose, no tinnitus, no hearing issues.  HEENT: no cold symptoms, no sinus pressure, rhinorrhea.  LUNGS: denies shortness of breath with exertion, no cough.  GI: no nausea   NEURO: no headaches, no dizziness, no TMJ pain.      EXAM:   /88   Pulse 113   Resp 16   Ht 5' 5\" (1.651 m)   Wt 244 lb (110.7 kg)   LMP 02/16/2025   SpO2 98%   BMI 40.60 kg/m²   GENERAL: well developed, well nourished,in no apparent distress  SKIN: no rashes,no suspicious lesions  EYES:PERRLA, EOMI,Conjunctiva normal.  HEENT:  Head atraumatic, normocephalic, throat, oral mucosa are clear, no erythema. No sinus tenderness.  TM- bilateral: erythema, mild bulging,no effusion. Hearing normal.  EAR canals bilaterally: noerythema,no tenderness, no discharge. R cerumen present   NECK: supple,no adenopathy  LUNGS: expiratory wheezing  to auscultation bilaterally.  CARDIO: RRR without murmur      ASSESSMENT AND PLAN:   Joaquina Monique is a 45 year old female who presents with   Diagnoses and all orders for this visit:    Acute non-recurrent frontal sinusitis  -     amoxicillin clavulanate 875-125 MG Oral Tab; Take 1 tablet by mouth 2 (two) times daily for 10 days.  -     methylPREDNISolone (MEDROL) 4 MG Oral Tablet Therapy Pack; As directed.       The patient indicates understanding of these issues and agrees to the plan.  The patient is asked to return if sx's persist or worsen.

## 2025-04-22 ENCOUNTER — LAB ENCOUNTER (OUTPATIENT)
Dept: LAB | Age: 45
End: 2025-04-22
Attending: FAMILY MEDICINE
Payer: COMMERCIAL

## 2025-04-22 DIAGNOSIS — E78.2 MIXED HYPERLIPIDEMIA: ICD-10-CM

## 2025-04-22 DIAGNOSIS — E66.01 CLASS 2 SEVERE OBESITY DUE TO EXCESS CALORIES WITH SERIOUS COMORBIDITY AND BODY MASS INDEX (BMI) OF 38.0 TO 38.9 IN ADULT (HCC): ICD-10-CM

## 2025-04-22 DIAGNOSIS — E66.812 CLASS 2 SEVERE OBESITY DUE TO EXCESS CALORIES WITH SERIOUS COMORBIDITY AND BODY MASS INDEX (BMI) OF 38.0 TO 38.9 IN ADULT (HCC): ICD-10-CM

## 2025-04-22 LAB
ALBUMIN SERPL-MCNC: 4.8 G/DL (ref 3.2–4.8)
ALBUMIN/GLOB SERPL: 2.1 {RATIO} (ref 1–2)
ALP LIVER SERPL-CCNC: 70 U/L (ref 39–100)
ALT SERPL-CCNC: 23 U/L (ref 10–49)
ANION GAP SERPL CALC-SCNC: 14 MMOL/L (ref 0–18)
AST SERPL-CCNC: 21 U/L (ref ?–34)
BILIRUB SERPL-MCNC: 1 MG/DL (ref 0.3–1.2)
BUN BLD-MCNC: 8 MG/DL (ref 9–23)
CALCIUM BLD-MCNC: 10.3 MG/DL (ref 8.7–10.6)
CHLORIDE SERPL-SCNC: 102 MMOL/L (ref 98–112)
CHOLEST SERPL-MCNC: 150 MG/DL (ref ?–200)
CO2 SERPL-SCNC: 26 MMOL/L (ref 21–32)
CREAT BLD-MCNC: 0.89 MG/DL (ref 0.55–1.02)
EGFRCR SERPLBLD CKD-EPI 2021: 81 ML/MIN/1.73M2 (ref 60–?)
EST. AVERAGE GLUCOSE BLD GHB EST-MCNC: 123 MG/DL (ref 68–126)
FASTING PATIENT LIPID ANSWER: YES
FASTING STATUS PATIENT QL REPORTED: YES
GLOBULIN PLAS-MCNC: 2.3 G/DL (ref 2–3.5)
GLUCOSE BLD-MCNC: 110 MG/DL (ref 70–99)
HBA1C MFR BLD: 5.9 % (ref ?–5.7)
HDLC SERPL-MCNC: 46 MG/DL (ref 40–59)
LDLC SERPL CALC-MCNC: 78 MG/DL (ref ?–100)
NONHDLC SERPL-MCNC: 104 MG/DL (ref ?–130)
OSMOLALITY SERPL CALC.SUM OF ELEC: 293 MOSM/KG (ref 275–295)
POTASSIUM SERPL-SCNC: 4.1 MMOL/L (ref 3.5–5.1)
PROT SERPL-MCNC: 7.1 G/DL (ref 5.7–8.2)
SODIUM SERPL-SCNC: 142 MMOL/L (ref 136–145)
TRIGL SERPL-MCNC: 150 MG/DL (ref 30–149)
VLDLC SERPL CALC-MCNC: 23 MG/DL (ref 0–30)

## 2025-04-22 PROCEDURE — 80061 LIPID PANEL: CPT | Performed by: FAMILY MEDICINE

## 2025-04-22 PROCEDURE — 83036 HEMOGLOBIN GLYCOSYLATED A1C: CPT | Performed by: FAMILY MEDICINE

## 2025-04-22 PROCEDURE — 80053 COMPREHEN METABOLIC PANEL: CPT | Performed by: FAMILY MEDICINE

## 2025-04-29 ENCOUNTER — OFFICE VISIT (OUTPATIENT)
Dept: FAMILY MEDICINE CLINIC | Facility: CLINIC | Age: 45
End: 2025-04-29
Payer: COMMERCIAL

## 2025-04-29 VITALS
WEIGHT: 241 LBS | OXYGEN SATURATION: 99 % | BODY MASS INDEX: 40.15 KG/M2 | HEIGHT: 65 IN | HEART RATE: 78 BPM | DIASTOLIC BLOOD PRESSURE: 88 MMHG | SYSTOLIC BLOOD PRESSURE: 130 MMHG

## 2025-04-29 DIAGNOSIS — Z00.00 LABORATORY EXAMINATION ORDERED AS PART OF A ROUTINE GENERAL MEDICAL EXAMINATION: Primary | ICD-10-CM

## 2025-04-29 PROCEDURE — 3079F DIAST BP 80-89 MM HG: CPT | Performed by: FAMILY MEDICINE

## 2025-04-29 PROCEDURE — 3075F SYST BP GE 130 - 139MM HG: CPT | Performed by: FAMILY MEDICINE

## 2025-04-29 PROCEDURE — 3008F BODY MASS INDEX DOCD: CPT | Performed by: FAMILY MEDICINE

## 2025-04-29 PROCEDURE — 99214 OFFICE O/P EST MOD 30 MIN: CPT | Performed by: FAMILY MEDICINE

## 2025-04-29 RX ORDER — PANTOPRAZOLE SODIUM 40 MG/1
40 TABLET, DELAYED RELEASE ORAL
Qty: 90 TABLET | Refills: 3 | Status: SHIPPED | OUTPATIENT
Start: 2025-04-29

## 2025-05-11 NOTE — PROGRESS NOTES
Subjective:   Joaquina Monique is a 45 year old female who presents for Follow - Up (6 month follow up )       History/Other:   History of Present Illness      She has recently lost 20 pounds by reducing junk food intake and being mindful of portion sizes. Despite this success, she is concerned about her cholesterol and A1c levels. Her cholesterol has decreased, but her A1c remains in the prediabetic range.    Her diet is high in carbohydrates, with a preference for sandwiches, pasta, and potatoes, as she does not consume much meat. She is considering reducing carbohydrate intake and substituting healthier options like quinoa and cauliflower rice.    She has a history of stress eating, particularly during her work as a , which she attributes to stress. She has been working on eliminating junk food from her home to combat this habit.    She experienced a past weight gain of 30 pounds over three months, which she associates with stopping her daily walks to the train and starting acid reflux medication. With recent weight loss, she has noticed improved breathing and less hip pain, allowing her to walk longer distances.    She is currently taking medication for stomach acid but does not specify the name or dosage. She is hesitant about starting weight loss medications due to past experiences with weight loss drugs.    No current issues with breathing. She experiences anxiety when visiting the doctor's office, which she attributes to her family history of similar reactions.         Chief Complaint Reviewed and Verified  Nursing Notes Reviewed and   Verified  Tobacco Reviewed  Allergies Reviewed  Medications Reviewed    Problem List Reviewed  Medical History Reviewed  Surgical History   Reviewed  Family History Reviewed  Social History Reviewed         Tobacco:  She smoked tobacco in the past but quit greater than 12 months ago.  Tobacco Use[1]     Current Medications[2]      Review of Systems:  Review  of Systems   Constitutional:  Negative for chills and fever.   HENT:  Negative for rhinorrhea and sinus pressure.    Eyes:  Negative for pain and visual disturbance.   Respiratory:  Negative for cough and shortness of breath.    Cardiovascular:  Negative for chest pain and palpitations.   Gastrointestinal:  Negative for abdominal pain, nausea and vomiting.   Genitourinary:  Negative for dysuria, frequency and urgency.   Musculoskeletal:  Positive for arthralgias. Negative for myalgias.   Skin:  Negative for pallor and rash.   Neurological:  Negative for dizziness and headaches.         Objective:   /88   Pulse 78   Ht 5' 5\" (1.651 m)   Wt 241 lb (109.3 kg)   LMP 02/16/2025   SpO2 99%   BMI 40.10 kg/m²  Estimated body mass index is 40.1 kg/m² as calculated from the following:    Height as of this encounter: 5' 5\" (1.651 m).    Weight as of this encounter: 241 lb (109.3 kg).  Physical Exam  Constitutional:       General: She is not in acute distress.     Appearance: She is obese.   Cardiovascular:      Rate and Rhythm: Normal rate and regular rhythm.   Pulmonary:      Effort: Pulmonary effort is normal.      Breath sounds: Normal breath sounds.   Skin:     Findings: No rash.   Neurological:      General: No focal deficit present.      Mental Status: She is alert and oriented to person, place, and time.   Psychiatric:         Mood and Affect: Mood normal.         Behavior: Behavior normal.       Results          Assessment & Plan:   1. Laboratory examination ordered as part of a routine general medical examination (Primary)  -     CBC With Differential With Platelet; Future; Expected date: 05/10/2025  -     Comp Metabolic Panel (14); Future; Expected date: 05/10/2025  -     Lipid Panel; Future; Expected date: 05/10/2025  -     TSH W Reflex To Free T4; Future; Expected date: 05/10/2025  Other orders  -     Pantoprazole Sodium; Take 1 tablet (40 mg total) by mouth every morning before breakfast.  Dispense: 90  tablet; Refill: 3    Assessment & Plan  Weight gain  Weight gain linked to stress eating and sedentary lifestyle. Progress in dietary habits noted. Discussed insulin resistance and prediabetes concerns. Open to medication if lifestyle changes are insufficient.  - Encourage further reduction of carbohydrate intake and mindful eating.  - Suggest replacing high-carb foods with healthier alternatives like cauliflower rice and quinoa.  - Encourage regular physical activity to aid weight loss and improve insulin sensitivity.    Prediabetes  Prediabetes with mildly elevated A1c. Motivated to improve blood sugar levels through lifestyle changes. Discussed potential need for medication if changes are insufficient.  - Encourage continued dietary modifications to reduce sugar and processed food intake.  - Monitor blood sugar levels and A1c regularly.  - Discuss potential need for medication if lifestyle changes do not yield desired results.          Return in about 6 months (around 10/29/2025) for annual physical.      MEHDI HERNANDEZ MD, 5/10/2025, 8:21 PM              [1]   Social History  Tobacco Use   Smoking Status Former    Current packs/day: 0.00    Average packs/day: 0.6 packs/day for 16.5 years (10.0 ttl pk-yrs)    Types: Cigarettes    Start date: 1997    Quit date: 2014    Years since quittin.3   Smokeless Tobacco Never   [2]   Current Outpatient Medications   Medication Sig Dispense Refill    pantoprazole 40 MG Oral Tab EC Take 1 tablet (40 mg total) by mouth every morning before breakfast. 90 tablet 3    fluticasone propionate 50 MCG/ACT Nasal Suspension by Each Nare route daily.      methylPREDNISolone (MEDROL) 4 MG Oral Tablet Therapy Pack As directed. 21 each 0    rosuvastatin 5 MG Oral Tab Take 1 tablet (5 mg total) by mouth nightly. 90 tablet 3    lisinopril-hydroCHLOROthiazide 10-12.5 MG Oral Tab Take 1 tablet by mouth daily. 90 tablet 3    ELDERBERRY OR Take by mouth.      cetirizine 10 MG Oral  Tab Take 1 tablet (10 mg total) by mouth daily.      Azelastine HCl 0.15 % Nasal Solution by Nasal route.      LEVOCETIRIZINE 5 MG Oral Tab TAKE 1 TABLET BY MOUTH EVERY DAY IN THE EVENING 90 tablet 3    albuterol 108 (90 Base) MCG/ACT Inhalation Aero Soln Inhale 2 puffs into the lungs every 4 (four) hours as needed for Wheezing or Shortness of Breath. 8 g 2    Emollient (COLLAGEN EX) Apply topically.      diphenhydrAMINE HCl (BENADRYL ALLERGY OR) Take by mouth.      Multiple Vitamin (MULTI-VITAMIN DAILY OR) Take  by mouth.

## 2025-07-27 ENCOUNTER — HOSPITAL ENCOUNTER (EMERGENCY)
Age: 45
Discharge: HOME | End: 2025-07-27
Attending: EMERGENCY MEDICINE

## 2025-07-27 ENCOUNTER — APPOINTMENT (OUTPATIENT)
Dept: ULTRASOUND IMAGING | Age: 45
End: 2025-07-27

## 2025-07-27 VITALS
RESPIRATION RATE: 18 BRPM | DIASTOLIC BLOOD PRESSURE: 85 MMHG | HEIGHT: 65 IN | OXYGEN SATURATION: 99 % | WEIGHT: 240 LBS | SYSTOLIC BLOOD PRESSURE: 146 MMHG | HEART RATE: 104 BPM | BODY MASS INDEX: 39.99 KG/M2 | TEMPERATURE: 98 F

## 2025-07-27 DIAGNOSIS — M79.605 LEG PAIN, ANTERIOR, LEFT: Primary | ICD-10-CM

## 2025-07-27 PROCEDURE — 99284 EMERGENCY DEPT VISIT MOD MDM: CPT

## 2025-07-27 PROCEDURE — 93971 EXTREMITY STUDY: CPT | Performed by: EMERGENCY MEDICINE

## 2025-07-27 RX ORDER — NAPROXEN 500 MG/1
500 TABLET ORAL 2 TIMES DAILY PRN
Qty: 10 TABLET | Refills: 0 | Status: SHIPPED | OUTPATIENT
Start: 2025-07-27 | End: 2025-08-01

## 2025-07-27 NOTE — ED PROVIDER NOTES
Patient Seen in: [facility] In [place]        History  Chief Complaint   Patient presents with    Leg or Foot Injury     Stated Complaint: left calf pain and numbness    Subjective:   The history is provided by the patient.       45-year-old female presented to the ER with anterior lower leg swelling and discomfort with associated paresthesias in her left foot since yesterday.  Patient was concerned she had a DVT so she came in for evaluation.  Patient has not had a history of DVT.  Denies any trauma, falls, injuries, chest pain, shortness of breath      Objective:     Past Medical History:    Allergic rhinitis    Asthma (HCC)    COVID    Oct 4    Esophageal reflux    Essential hypertension    Hiatal hernia    High blood pressure    High cholesterol    Hyperlipidemia    Obesity, unspecified    Pneumonia, organism unspecified(486)    Visual impairment    GLASSES              Past Surgical History:   Procedure Laterality Date    Colonoscopy N/A 2024    Procedure: COLONOSCOPY;  Surgeon: Charo Keith MD;  Location:  ENDOSCOPY    Other surgical history      wisdom teeth    Tonsillectomy                  Social History     Socioeconomic History    Marital status: Single   Tobacco Use    Smoking status: Former     Current packs/day: 0.00     Average packs/day: 0.6 packs/day for 16.5 years (10.0 ttl pk-yrs)     Types: Cigarettes     Start date: 1997     Quit date: 2014     Years since quittin.5    Smokeless tobacco: Never   Vaping Use    Vaping status: Never Used   Substance and Sexual Activity    Alcohol use: Not Currently     Alcohol/week: 1.0 standard drink of alcohol     Types: 1 Glasses of wine per week     Comment: occasionally    Drug use: Never    Sexual activity: Not Currently     Partners: Male     Birth control/protection: Abstinence   Other Topics Concern    Caffeine Concern No    Exercise No    Seat Belt Yes    Special Diet No    Stress Concern No    Weight  Concern No     Social Drivers of Health     Food Insecurity: Patient Declined (4/5/2025)    NCSS - Food Insecurity     Worried About Running Out of Food in the Last Year: Patient declined     Ran Out of Food in the Last Year: Patient declined   Transportation Needs: No Transportation Needs (4/5/2025)    NCSS - Transportation     Lack of Transportation: No   Housing Stability: Patient Declined (4/5/2025)    NCSS - Housing/Utilities     Has Housing: Patient declined     Worried About Losing Housing: Patient declined     Unable to Get Utilities: Patient declined                                Physical Exam    ED Triage Vitals [07/27/25 1228]   /85   Pulse 104   Resp 18   Temp 97.8 °F (36.6 °C)   Temp src Temporal   SpO2 99 %   O2 Device None (Room air)       Current Vitals:   Vital Signs  BP: 146/85  Pulse: 104  Resp: 18  Temp: 97.8 °F (36.6 °C)  Temp src: Temporal    Oxygen Therapy  SpO2: 99 %  O2 Device: None (Room air)            Physical Exam  Vitals and nursing note reviewed.   Constitutional:       Appearance: Normal appearance.   HENT:      Head: Normocephalic and atraumatic.   Cardiovascular:      Rate and Rhythm: Normal rate.   Pulmonary:      Effort: Pulmonary effort is normal.   Musculoskeletal:      Comments: Mild isolated induration in the left anterior lower leg distally.  No erythema, tenderness, deformity.  2+ DP and PT pulse on her left foot noted.   Skin:     General: Skin is warm and dry.   Neurological:      Mental Status: She is alert and oriented to person, place, and time.      Comments: Moving all extremities, normal speech   Psychiatric:         Mood and Affect: Mood normal.         Behavior: Behavior normal.                 ED Course  Labs Reviewed - No data to display       US VENOUS DOPPLER LEG LEFT - DIAG IMG (CPT=93971)  Result Date: 7/27/2025  CONCLUSION: No DVT left lower extremity. Electronically Verified and Signed by Attending Radiologist: Jm Victoria MD 7/27/2025 1:11 PM  Workstation: [id number]                      MDM     Trivial swelling on her left anterior lower leg, atraumatic  Reassurance provided, advised NSAIDs  Ultrasound negative for DVT        Medical Decision Making  Amount and/or Complexity of Data Reviewed  Radiology: ordered.        Disposition and Plan     Clinical Impression:  1. Leg pain, anterior, left         Disposition:  Discharge  7/27/2025  1:20 pm    Follow-up:  Danielle Nixon MD  [id number]  [facility] [id number]  [phone]    Schedule an appointment as soon as possible for a visit            Medications Prescribed:  Discharge Medication List as of 7/27/2025  1:34 PM        START taking these medications    Details   naproxen 500 MG Oral Tab Take 1 tablet (500 mg total) by mouth 2 (two) times daily as needed (pain)., Normal, Disp-10 tablet, R-0                   Supplementary Documentation:

## 2025-08-09 ENCOUNTER — OFFICE VISIT (OUTPATIENT)
Dept: FAMILY MEDICINE CLINIC | Facility: CLINIC | Age: 45
End: 2025-08-09

## 2025-08-09 VITALS
RESPIRATION RATE: 21 BRPM | OXYGEN SATURATION: 98 % | WEIGHT: 235 LBS | DIASTOLIC BLOOD PRESSURE: 90 MMHG | HEIGHT: 65 IN | HEART RATE: 85 BPM | SYSTOLIC BLOOD PRESSURE: 136 MMHG | BODY MASS INDEX: 39.15 KG/M2

## 2025-08-09 DIAGNOSIS — R20.2 LEFT LEG PARESTHESIAS: Primary | ICD-10-CM

## 2025-08-09 DIAGNOSIS — M25.572 LEFT ANKLE PAIN, UNSPECIFIED CHRONICITY: ICD-10-CM

## 2025-08-09 DIAGNOSIS — R73.03 PREDIABETES: ICD-10-CM

## 2025-08-09 LAB — HEMOGLOBIN A1C: 5.6 % (ref 4.3–5.6)

## (undated) DEVICE — KIT VLV 5 PC AIR H2O SUCT BX ENDOGATOR CONN

## (undated) DEVICE — 10FT COMBINED O2 DELIVERY/CO2 MONITORING. FILTER WITH MICROSTREAM TYPE LUER: Brand: DUAL ADULT NASAL CANNULA

## (undated) DEVICE — 3M™ RED DOT™ MONITORING ELECTRODE WITH FOAM TAPE AND STICKY GEL, 50/BAG, 20/CASE, 72/PLT 2570: Brand: RED DOT™

## (undated) DEVICE — 1200CC GUARDIAN II: Brand: GUARDIAN

## (undated) DEVICE — KIT CUSTOM ENDOPROCEDURE STERIS

## (undated) NOTE — LETTER
ASTHMA ACTION PLAN for Joshua Ramos     : 1980     Date: 10/21/2022  Provider:  Jeanie Wagner MD  Phone for doctor or clinic: [address] [facility], [place]  [address] [address], [facility] [address] [address] [phone]    ACT Score: 21      You can use the colors of a traffic light to help learn about your asthma medicines. 1. Green - Go! % of Personal Best Peak Flow Use controller medicine. Breathing is good  No cough or wheeze  Can work and play Medicine How much to take When to take it    None      2. Yellow - Caution. 50-79% Personal Best Peak  Flow. Use reliever medicine to keep an asthma attack from getting bad. Cough  Wheezing  Tight Chest  Wake up at night Medicine How much to take When to take it    Albuterol Inhaler                   2 puffs                             Every 4 to 6 hrs as needed for                                                                                  shortness of breath and wheezing. Additional instructions Call our office in event of an increased use of rescue inhaler to make an appointment in order to prevent red zone symptoms. 3. Red - Stop! Danger!  <50% Personal Best Peak  Flow. Take these medications until  Get help from a doctor   Medicine not helping  Breathing is hard and fast  Nose opens wide  Can't walk  Ribs show  Can't talk well Medicine How much to take When to take it    Call 911, or go to the emergency room immediately! Take Albuterol every 15 minutes until you have received medical attention. Additional Instructions If your symptoms do not improve and you cannot contact your doctor, [facility] or call 911 immediately! [x] Asthma Action Plan reviewed with patient (and caregiver if necessary) and a copy of the plan was given to the patient/caregiver. [] Asthma Action Plan reviewed with patient (and caregiver if necessary) on the phone and mailed copy to patient or submitted via [address] Signatures:  Provider  Gabriela Renee MD   Patient Caretaker

## (undated) NOTE — MR AVS SNAPSHOT
[address]  [address] [facility] [address] [phone]               Thank you for choosing us for your health care visit with GENTRY Myers.   We are glad to serve you and happy to provide you with this summary o · Ask your provider when can you take a shower or bathe. · Ask your provider about the best way to keep your incision dry when bathing or showering. · Pat sutures dry if they get wet. Don’t rub.   · Leave the bandage (dressing) in place until you are told · After the first 48 hours the incision wound usually will have closed. At this point, leave the incision uncovered and open to the air. If the incision has not closed keep it covered.   · Cover your incision only if your clothing is rubbing it or causing i This list is accurate as of: 5/8/17  6:02 PM.  Always use your most recent med list.                Albuterol Sulfate  (90 Base) MCG/ACT Aers   Inhale 2 puffs into the lungs every 4 (four) hours as needed for Wheezing or Shortness of Breath.    Commo

## (undated) NOTE — LETTER
ASTHMA ACTION PLAN for Marilee Brothers     : 1980     Date: 2020  Provider:  Sydnie Abbott MD  Phone for doctor or clinic: [address] [facility], [address], [address] [facility] [address], [facility] [address]     ACT Score: 19 Yo

## (undated) NOTE — MR AVS SNAPSHOT
[address] [facility] [address] [phone]               Thank you for choosing us for your health care visit with Elliot Dubose MD.  We are glad to serve you and happy to provide you with this summary of your You can access your MyChart to more actively manage your health care and view more details from this visit by going to [email or web address].   If you've recently had a stay at the Hospital you can access your discharge instructions in [address] by clem

## (undated) NOTE — LETTER
ASTHMA ACTION PLAN for Kris Winchester     : 1980     Date: 2021  Provider:  Bubba Godinez MD  Phone for doctor or clinic: [address] [facility],  [address], [place] [id number], [facility] [address]     ACT Score: 21 Yo

## (undated) NOTE — MR AVS SNAPSHOT
[address] [employer] [facility] [address] [phone]               Thank you for choosing us for your health care visit with Stuart Nance MD.  We are glad to serve you and happy to provide you with this summary of your Albuterol Sulfate  (90 Base) MCG/ACT Aers   Inhale 2 puffs into the lungs every 4 (four) hours as needed for Wheezing or Shortness of Breath.    Commonly known as:  PROAIR HFA           Fluticasone Propionate  MCG/ACT Aero   Inhal Visit [facility] online at  [email or web address]

## (undated) NOTE — MR AVS SNAPSHOT
[address]  [address]  [address] [id number] [phone]               Thank you for choosing us for your health care visit with GENTRY Tuttle.   We are glad to serve you and happy to provide you with this summary o · Use a vacuum with a double-layered bag or HEPA (high-efficiency particulate air) filter. · Pets with fur or feathers are triggers for some people.  If you must have pets, take these precautions:  · Keep pets out of your bedroom and off your bed. Keep the · If you monitor symptoms with a peak flow meter, readings less than 50% of your personal best   Date Last Reviewed: 8/18/2014  © 0871-7482 [facility] [address] [place] [address], [address] [place] [address]. All rights reserved.  This information is not i Aspirin should never be given to anyone younger than 25years of age who is ill with a viral infection or fever. It may cause severe liver or brain damage. · Your appetite may be poor, so a light diet is fine.  Avoid dehydration by drinking 6 to 8 glasses Date Last Reviewed: 9/13/2015  © 7124-6056 [address], [address] [place], [place] [address]. All rights reserved. This information is not intended as a substitute for professional medical care.  Always follow your healthcare professional Where to Get Your Medications      These medications were sent to [address] [place] [address] [address] [phone], [address] [place] [address], [facility] [address]     Phone:  [phone]    - Albuterol Sulfate  (05 BAS

## (undated) NOTE — Clinical Note
ASTHMA ACTION PLAN for Gio Espinoza     : 1980     Date: 2017  Provider:  Angela Loza MD  Phone for doctor or clinic: [address] [facility] [address], [address] [facility]  [phone]           You can use the

## (undated) NOTE — LETTER
ASTHMA ACTION PLAN for Effie      : 1980     Date: 2018  Provider:  Katie Morris MD  Phone for doctor or clinic: [address] [facility],  [address], [address]  [facility] [address] [phone]    ACT Score: 25      Y

## (undated) NOTE — LETTER
ASTHMA ACTION PLAN for Maribel Camarena     : 1980     Date: 10/5/2023  Provider:  Cristofer Dao MD  Phone for doctor or clinic: [facility], [address], [place]  [address]  [place] [phone]    ACT Score: 23      You can use the colors of a traffic light to help learn about your asthma medicines. 1. Green - Go! % of Personal Best Peak Flow Use controller medicine. Breathing is good  No cough or wheeze  Can work and play Medicine How much to take When to take it    LEVOCETIRIZINE    once                                  daily      2. Yellow - Caution. 50-79% Personal Best Peak  Flow. Use reliever medicine to keep an asthma attack from getting bad. Cough  Wheezing  Tight Chest  Wake up at night Medicine How much to take When to take it    Albuterol Inhaler                   2 puffs                             Every 4 to 6 hrs as needed for                                                                                  shortness of breath and wheezing. Additional instructions Call our office in event of an increased use of rescue inhaler to make an appointment in order to prevent red zone symptoms. 3. Red - Stop! Danger!  <50% Personal Best Peak  Flow. Take these medications until  Get help from a doctor   Medicine not helping  Breathing is hard and fast  Nose opens wide  Can't walk  Ribs show  Can't talk well Medicine How much to take When to take it    Call 911, or go to the emergency room immediately! Take Albuterol every 15 minutes until you have received medical attention. Additional Instructions If your symptoms do not improve and you cannot contact your doctor, [facility] or call 911 immediately! [x] Asthma Action Plan reviewed with patient (and caregiver if necessary) and a copy of the plan was given to the patient/caregiver.    [] Asthma Action Plan reviewed with patient (and caregiver if necessary) on the phone and mailed copy to patient or submitted via [address]      Signatures:  Provider  Sunny Salazar MD   Patient Caretaker

## (undated) NOTE — Clinical Note
ASTHMA ACTION PLAN for Sasha Bina     : 1980     Date: 2017  Provider:  Fred Watkins MD  Phone for doctor or clinic: [facility], [address], [address] [facility]      ACT= 20      You can